# Patient Record
Sex: FEMALE | Race: WHITE | NOT HISPANIC OR LATINO
[De-identification: names, ages, dates, MRNs, and addresses within clinical notes are randomized per-mention and may not be internally consistent; named-entity substitution may affect disease eponyms.]

---

## 2018-06-15 ENCOUNTER — RECORD ABSTRACTING (OUTPATIENT)
Age: 83
End: 2018-06-15

## 2018-06-15 DIAGNOSIS — Z92.241 PERSONAL HISTORY OF SYSTEMIC STEROID THERAPY: ICD-10-CM

## 2018-06-15 DIAGNOSIS — C80.1 MALIGNANT (PRIMARY) NEOPLASM, UNSPECIFIED: ICD-10-CM

## 2018-06-15 DIAGNOSIS — Z82.49 FAMILY HISTORY OF ISCHEMIC HEART DISEASE AND OTHER DISEASES OF THE CIRCULATORY SYSTEM: ICD-10-CM

## 2018-06-15 DIAGNOSIS — Z85.9 PERSONAL HISTORY OF MALIGNANT NEOPLASM, UNSPECIFIED: ICD-10-CM

## 2018-06-15 DIAGNOSIS — Z87.19 PERSONAL HISTORY OF OTHER DISEASES OF THE DIGESTIVE SYSTEM: ICD-10-CM

## 2018-06-15 RX ORDER — TRAMADOL HYDROCHLORIDE 50 MG/1
50 TABLET, COATED ORAL
Refills: 0 | Status: ACTIVE | COMMUNITY

## 2018-06-15 RX ORDER — LIDOCAINE 50 MG/G
5 PATCH CUTANEOUS
Refills: 0 | Status: ACTIVE | COMMUNITY

## 2018-06-18 ENCOUNTER — APPOINTMENT (OUTPATIENT)
Dept: ORTHOPEDIC SURGERY | Facility: CLINIC | Age: 83
End: 2018-06-18
Payer: MEDICARE

## 2018-06-18 VITALS — HEIGHT: 61 IN | WEIGHT: 180 LBS | BODY MASS INDEX: 33.99 KG/M2

## 2018-06-18 PROCEDURE — 99213 OFFICE O/P EST LOW 20 MIN: CPT

## 2018-06-29 ENCOUNTER — APPOINTMENT (OUTPATIENT)
Dept: ORTHOPEDIC SURGERY | Facility: CLINIC | Age: 83
End: 2018-06-29
Payer: MEDICARE

## 2018-06-29 VITALS — WEIGHT: 180 LBS | BODY MASS INDEX: 33.99 KG/M2 | HEIGHT: 61 IN

## 2018-06-29 DIAGNOSIS — M19.072 PRIMARY OSTEOARTHRITIS, LEFT ANKLE AND FOOT: ICD-10-CM

## 2018-06-29 PROCEDURE — 73610 X-RAY EXAM OF ANKLE: CPT | Mod: LT

## 2018-06-29 PROCEDURE — 20610 DRAIN/INJ JOINT/BURSA W/O US: CPT | Mod: LT

## 2018-06-29 PROCEDURE — 99214 OFFICE O/P EST MOD 30 MIN: CPT | Mod: 25

## 2018-06-29 RX ORDER — FUROSEMIDE 20 MG/1
20 TABLET ORAL
Qty: 30 | Refills: 0 | Status: ACTIVE | COMMUNITY
Start: 2018-04-16

## 2018-06-29 RX ORDER — AMOXICILLIN 500 MG/1
500 CAPSULE ORAL
Qty: 21 | Refills: 0 | Status: ACTIVE | COMMUNITY
Start: 2018-04-04

## 2018-06-29 RX ORDER — FLUTICASONE PROPIONATE 50 UG/1
50 SPRAY, METERED NASAL
Qty: 16 | Refills: 0 | Status: ACTIVE | COMMUNITY
Start: 2018-02-05

## 2018-06-29 RX ORDER — ROSUVASTATIN CALCIUM 40 MG/1
40 TABLET, FILM COATED ORAL
Qty: 90 | Refills: 0 | Status: ACTIVE | COMMUNITY
Start: 2018-05-14

## 2018-06-29 RX ORDER — ROSUVASTATIN CALCIUM 20 MG/1
20 TABLET, FILM COATED ORAL
Qty: 30 | Refills: 0 | Status: ACTIVE | COMMUNITY
Start: 2017-05-15

## 2018-10-23 ENCOUNTER — APPOINTMENT (OUTPATIENT)
Dept: ORTHOPEDIC SURGERY | Facility: CLINIC | Age: 83
End: 2018-10-23
Payer: MEDICARE

## 2018-10-23 PROCEDURE — 20610 DRAIN/INJ JOINT/BURSA W/O US: CPT

## 2018-10-23 PROCEDURE — 72100 X-RAY EXAM L-S SPINE 2/3 VWS: CPT

## 2018-10-23 PROCEDURE — 73564 X-RAY EXAM KNEE 4 OR MORE: CPT | Mod: LT

## 2018-10-23 PROCEDURE — 99214 OFFICE O/P EST MOD 30 MIN: CPT | Mod: 25

## 2018-12-28 ENCOUNTER — APPOINTMENT (OUTPATIENT)
Dept: ORTHOPEDIC SURGERY | Facility: CLINIC | Age: 83
End: 2018-12-28
Payer: MEDICARE

## 2018-12-28 VITALS — BODY MASS INDEX: 33.99 KG/M2 | WEIGHT: 180 LBS | HEIGHT: 61 IN

## 2018-12-28 DIAGNOSIS — M17.10 UNILATERAL PRIMARY OSTEOARTHRITIS, UNSPECIFIED KNEE: ICD-10-CM

## 2018-12-28 PROCEDURE — 20610 DRAIN/INJ JOINT/BURSA W/O US: CPT | Mod: LT

## 2018-12-28 PROCEDURE — 99214 OFFICE O/P EST MOD 30 MIN: CPT | Mod: 25

## 2019-02-25 ENCOUNTER — RX RENEWAL (OUTPATIENT)
Age: 84
End: 2019-02-25

## 2019-04-02 ENCOUNTER — APPOINTMENT (OUTPATIENT)
Dept: ORTHOPEDIC SURGERY | Facility: CLINIC | Age: 84
End: 2019-04-02
Payer: MEDICARE

## 2019-04-02 VITALS — HEIGHT: 61 IN | WEIGHT: 180 LBS | BODY MASS INDEX: 33.99 KG/M2

## 2019-04-02 PROCEDURE — 20610 DRAIN/INJ JOINT/BURSA W/O US: CPT | Mod: LT

## 2019-04-02 PROCEDURE — 73030 X-RAY EXAM OF SHOULDER: CPT | Mod: RT

## 2019-04-02 PROCEDURE — 99214 OFFICE O/P EST MOD 30 MIN: CPT | Mod: 25

## 2019-04-02 NOTE — END OF VISIT
[FreeTextEntry3] : All medical record entries made by the [facility] were at my, Dr. Austin Luna, direction and personally dictated by me on 04/02/2019. I have reviewed the chart and agree that the record accurately reflects my personal performance of the history, physical exam, assessment and plan. I have also personally directed, reviewed, and agreed with the chart.

## 2019-04-02 NOTE — ADDENDUM
[FreeTextEntry1] : I, Nicole Worthington, acted solely as a scribe for Dr. Austin Luna on this date 04/02/2019.

## 2019-04-02 NOTE — DISCUSSION/SUMMARY
[de-identified] : The underlying pathophysiology was reviewed in great detail with the patient as well as the various treatment options, including ice, analgesics, NSAIDs, Physical therapy, steroid injections.\par  \par The patient wishes to proceed with an ASPIRATION and DUROLANE injection of the left knee. \par \par FU PRN.

## 2019-04-02 NOTE — PROCEDURE
[de-identified] : At this point I recommended an aspiration and therapeutic injection and under sterile precautions an injection of 3 cc of Durolane (Lot: [id number], Expiration: 09/30/2020), was placed into the joint of the Left knee without complication after 10 cc of clear synovial fluid was aspirated.

## 2019-04-02 NOTE — HISTORY OF PRESENT ILLNESS
[de-identified] : 83 year old female presents for an evaluation of chronic left knee pain, she has been diagnosed with osteoarthritis of her knee. At her last visit on 12/28/2019 she received a corticosteroid injection of her left knee and reports that it alleviated her symptoms which has since returned. She reports that she has been experiencing a constant aching pain located along the medial aspect of her left knee that is exacerbated with walking, bending, and climbing stairs.She has been treated with hyaluronic acid injections in the past and would like to proceed with a Monovisc injection of her left knee today. She also reports that she has begun developing pain along the anterior aspect of her right shoulder that is exacerbated with us of her right arm.

## 2019-04-02 NOTE — PHYSICAL EXAM
[Normal RLE] : Right Lower Extremity: No scars, rashes, lesions, ulcers, skin intact [Normal LLE] : Left Lower Extremity: No scars, rashes, lesions, ulcers, skin intact [Normal Touch] : sensation intact for touch [Normal] : No swelling, no edema, normal pedal pulses and normal temperature [Obese] : obese [Normal RUE] : Right Upper Extremity: No scars, rashes, lesions, ulcers, skin intact [Normal LUE] : Left Upper Extremity: No scars, rashes, lesions, ulcers, skin intact [Poor Appearance] : well-appearing [Acute Distress] : not in acute distress [de-identified] : Right Upper Extremity\par o Shoulder :\par ¦ Inspection/Palpation : no tenderness, no swelling, no deformity \par ¦ Range of Motion : ACTIVE FORWARD ELEVATION: Measured at 145 degrees, ACTIVE EXTERNAL ROTATION: Measured at 40 degrees, ACTIVE INTERNAL ROTATION: Measured at PSIS\par ¦ Strength : external rotation 5/5, internal rotation 5/5, supraspinatus 5/5 \par ¦ Stability : no joint instability on provocative testing\par o Upper Arm : no tenderness, no swelling, no deformities\par o Muscle Bulk : no atrophy \par o Sensation : sensation intact to light touch \par o Skin : no skin rash, no discoloration \par o Vascular Exam : no edema, no cyanosis, radial and ulnar pulses normal \par \par Left Lower Extremity\par o Knee :\par ¦ Inspection/Palpation :  medial tenderness, swelling with 1+ effusion, no deformity \par ¦ Range of Motion : 0 - 120 degrees\par ¦ Stability : no valgus or varus instability present on provocative testing, Lachman’s Test (-)\par ¦ Strength : flexion and extension 5/5\par o Muscle Bulk : normal muscle bulk present\par o Skin : no erythema, no ecchymosis \par o Sensation : sensation to pin intact\par o Vascular Exam : no edema, no cyanosis, dorsalis pedis artery pulse 2+, posterior tibial artery pulse 2+  [de-identified] : o  Right Shoulder : Internal/External rotation, and outlet views were obtained, there are no soft tissue abnormalities, no fractures, alignment is normal, mild glenohumeral osteoarthritis, small osteophyte in the inferior humeral head.

## 2019-06-11 ENCOUNTER — APPOINTMENT (OUTPATIENT)
Dept: ORTHOPEDIC SURGERY | Facility: CLINIC | Age: 84
End: 2019-06-11
Payer: MEDICARE

## 2019-06-11 VITALS — HEIGHT: 61 IN | BODY MASS INDEX: 33.99 KG/M2 | WEIGHT: 180 LBS

## 2019-06-11 DIAGNOSIS — S76.911A STRAIN OF UNSPECIFIED MUSCLES, FASCIA AND TENDONS AT THIGH LEVEL, RIGHT THIGH, INITIAL ENCOUNTER: ICD-10-CM

## 2019-06-11 PROCEDURE — 20610 DRAIN/INJ JOINT/BURSA W/O US: CPT | Mod: LT

## 2019-06-11 PROCEDURE — 99213 OFFICE O/P EST LOW 20 MIN: CPT | Mod: 25

## 2019-06-11 NOTE — HISTORY OF PRESENT ILLNESS
[de-identified] : 83 year old female presents for an evaluation of chronic left knee pain, she has been diagnosed with advanced medial compartment osteoarthritis of her left knee. At her last visit on 4/2/2019 she underwent and aspiration and received a Durolane injection of the left knee, this only provided her with temporary relief from her symptoms. Her pain has since returned and she has continued to experience a a moderate aching pain along the medial aspect of her left knee that is constant in nature. Her pain is exacerbated with walking, bending, weight bearing, and climbing stairs. The patient would like to proceed with a corticosteroid injection of the left knee at this time.

## 2019-06-11 NOTE — PHYSICAL EXAM
[Normal RLE] : Right Lower Extremity: No scars, rashes, lesions, ulcers, skin intact [Normal LLE] : Left Lower Extremity: No scars, rashes, lesions, ulcers, skin intact [Normal Touch] : sensation intact for touch [Normal] : No swelling, no edema, normal pedal pulses and normal temperature [Obese] : obese [Poor Appearance] : well-appearing [Acute Distress] : not in acute distress [de-identified] : Right Lower Extremity\par o Knee :\par ¦ Inspection/Palpation : tenderness to palpation over the adductor, no swelling, no deformity \par ¦ Range of Motion : 0 - 115 degrees\par ¦ Stability : no valgus or varus instability present on provocative testing, Lachman’s Test (-)\par ¦ Strength : flexion and extension 5/5\par o Muscle Bulk : normal muscle bulk present\par o Skin : no erythema, no ecchymosis \par o Sensation : sensation to pin intact\par o Vascular Exam : no edema, no cyanosis, dorsalis pedis artery pulse 2+, posterior tibial artery pulse 2+ \par \par Left Lower Extremity\par o Knee :\par ¦ Inspection/Palpation : moderate medial joint line tenderness, no swelling, no deformity \par ¦ Range of Motion : 0 - 110 degrees\par ¦ Stability : no valgus or varus instability present on provocative testing, Lachman’s Test (-)\par ¦ Strength : flexion and extension 5-/5\par o Muscle Bulk : normal muscle bulk present\par o Skin : no erythema, no ecchymosis \par o Sensation : sensation to pin intact\par o Vascular Exam : no edema, no cyanosis, dorsalis pedis artery pulse 2+, posterior tibial artery pulse 2+

## 2019-06-11 NOTE — END OF VISIT
[FreeTextEntry3] : All medical record entries made by the [facility] were at my, Dr. Austin Luna, direction and personally dictated by me on 06/11/2019. I have reviewed the chart and agree that the record accurately reflects my personal performance of the history, physical exam, assessment and plan. I have also personally directed, reviewed, and agreed with the chart.

## 2019-06-11 NOTE — DISCUSSION/SUMMARY
[de-identified] : The underlying pathophysiology was reviewed in great detail with the patient as well as the various treatment options, including ice, analgesics, NSAIDs, Physical therapy, steroid injections.\par \par The patient wishes to proceed with an INJECTION of the left knee.\par \par She is to continue with physical therapy.\par \par FU PRN.

## 2019-06-11 NOTE — PROCEDURE
[de-identified] : At this point I recommended a therapeutic injection and under sterile precautions an injection of 5 cc 1% lidocaine with 0.5 cc of Kenalog and 0.5 cc of Dexamethasone - was placed into the joint of the Left knee without complication, and after several minutes, the patient felt significant relief.

## 2019-06-11 NOTE — ADDENDUM
[FreeTextEntry1] : I, Nicole Worthington, acted solely as a scribe for Dr. Austin Luna on this date 06/11/2019.

## 2019-09-27 ENCOUNTER — APPOINTMENT (OUTPATIENT)
Dept: ORTHOPEDIC SURGERY | Facility: CLINIC | Age: 84
End: 2019-09-27
Payer: MEDICARE

## 2019-09-27 DIAGNOSIS — S86.111A STRAIN OF OTHER MUSCLE(S) AND TENDON(S) OF POSTERIOR MUSCLE GROUP AT LOWER LEG LEVEL, RIGHT LEG, INITIAL ENCOUNTER: ICD-10-CM

## 2019-09-27 DIAGNOSIS — M50.30 OTHER CERVICAL DISC DEGENERATION, UNSPECIFIED CERVICAL REGION: ICD-10-CM

## 2019-09-27 PROCEDURE — 20610 DRAIN/INJ JOINT/BURSA W/O US: CPT | Mod: LT

## 2019-09-27 PROCEDURE — 99214 OFFICE O/P EST MOD 30 MIN: CPT | Mod: 25

## 2019-09-27 NOTE — DISCUSSION/SUMMARY
[de-identified] : The underlying pathophysiology was reviewed in great detail with the patient as well as the various treatment options, including ice, analgesics, NSAIDs, Physical therapy, steroid injections.\par \par The patient wishes to proceed with an INJECTION of the left knee.\par \par A prescription for Physical Therapy was provided for the neck and lower back.\par \par FU PRN.

## 2019-09-27 NOTE — END OF VISIT
[FreeTextEntry3] : All medical record entries made by the [facility] were at my, Dr. Austin Luna, direction and personally dictated by me on 09/27/2019. I have reviewed the chart and agree that the record accurately reflects my personal performance of the history, physical exam, assessment and plan. I have also personally directed, reviewed, and agreed with the chart.

## 2019-09-27 NOTE — ADDENDUM
[FreeTextEntry1] : I, Savana Marie, acted solely as a scribe for Dr. Austin Luna on this date 09/27/2019.

## 2019-09-27 NOTE — PROCEDURE
[de-identified] : At this point I recommended a therapeutic injection and under sterile precautions an injection of 5 cc 1% lidocaine with 0.5 cc of Kenalog and 0.5 cc of Dexamethasone - was placed into the joint of the Left knee without complication, and after several minutes, the patient felt significant relief.

## 2019-09-27 NOTE — HISTORY OF PRESENT ILLNESS
[de-identified] : 84 year old female presents for an evaluation of chronic left knee pain and right lower leg pain, she has been diagnosed with advanced medial compartment osteoarthritis of her left knee.  At her last visit on 6/11/2019 she received a corticosteroid injection of the left knee which greatly alleviated her symptoms. Her pain has since return and she reports that she continues to experience an aching pain along the medial aspect of her left knee that is constant in nature. She also reports an aching pain located along the anterior aspect of her right lower leg, as well as sensations of numbness. Her symptoms are exacerbated with walking, deep bending, and with climbing stairs. The patient has no other complaints at this time.

## 2019-12-06 ENCOUNTER — APPOINTMENT (OUTPATIENT)
Dept: ORTHOPEDIC SURGERY | Facility: CLINIC | Age: 84
End: 2019-12-06
Payer: MEDICARE

## 2019-12-06 PROCEDURE — 99214 OFFICE O/P EST MOD 30 MIN: CPT | Mod: 25

## 2019-12-06 PROCEDURE — 20610 DRAIN/INJ JOINT/BURSA W/O US: CPT | Mod: LT

## 2019-12-07 NOTE — PROCEDURE
[de-identified] : At this point I recommended a therapeutic injection and under sterile precautions an injection of 3 cc of Durolane (Lot: [id number], Expiration: 06/2022), was placed into the joint of the Left knee without complication.

## 2019-12-07 NOTE — DISCUSSION/SUMMARY
[de-identified] : The underlying pathophysiology was reviewed in great detail with the patient as well as the various treatment options, including ice, analgesics, NSAIDs, Physical therapy, steroid injections.\par \par The patient wishes to proceed with a DUROLANE injection of the left knee. \par \par An MRI of the lumbar spine was ordered to rule out HNP. \par \par FU after imaging is obtained.

## 2019-12-07 NOTE — PHYSICAL EXAM
[Normal RLE] : Right Lower Extremity: No scars, rashes, lesions, ulcers, skin intact [Normal LLE] : Left Lower Extremity: No scars, rashes, lesions, ulcers, skin intact [Normal] : No swelling, no edema, normal pedal pulses and normal temperature [Normal Touch] : sensation intact for touch [Obese] : obese [Poor Appearance] : well-appearing [Acute Distress] : not in acute distress [de-identified] : Right Lower Extremity\par o Knee :\par ¦ Inspection/Palpation : no tenderness to palpation, no swelling, no deformity\par ¦ Range of Motion : 0 - 110 degrees, no crepitus\par ¦ Stability : no valgus or varus instability present on provocative testing, Lachman’s Test (-)\par ¦ Strength : flexion and extension 5/5, dorsiflexion 5/5\par o Muscle Bulk : normal muscle bulk present\par o Skin : no erythema, no ecchymosis\par o Sensation : dull sensation over the distal lateral lower leg and the lateral foot\par o Vascular Exam : no edema, no cyanosis, dorsalis pedis artery pulse 2+, posterior tibial artery pulse 2+ \par \par Left Lower Extremity\par o Knee :\par ¦ Inspection/Palpation : medial joint line tenderness, trace effusion, no deformity \par ¦ Range of Motion : 0 - 100 degrees\par ¦ Stability : no valgus or varus instability present on provocative testing, Lachman’s Test (-)\par ¦ Strength : flexion and extension 3/5\par o Muscle Bulk : normal muscle bulk present\par o Skin : no erythema, no ecchymosis \par o Sensation : sensation to pin intact\par o Vascular Exam : no edema, no cyanosis, dorsalis pedis artery pulse 2+, posterior tibial artery pulse 2+

## 2019-12-07 NOTE — HISTORY OF PRESENT ILLNESS
[de-identified] : 84 year old female presents for an evaluation of chronic left knee and right leg pain, she has been diagnosed with advanced medial compartment osteoarthritis of her left knee. At her last visit on 9/27/2019 she received a corticosteroid injection of her left knee which greatly alleviated her symptoms. Her pain has since returned and she describes an aching pain along the medial aspect of her left knee that is constant in nature. Her symptoms are exacerbated with walking, deep bending, and with climbing stairs. The patient also reports that she continues to experience pain along the anterior aspect of her right lower leg that radiates down her left right foot, as well as numbness radiating down the lower leg. The patient has no other complaints at this time.

## 2019-12-07 NOTE — ADDENDUM
[FreeTextEntry1] : I, Savana Marie, acted solely as a scribe for Dr. Austin Luna on this date 12/06/2019.

## 2019-12-10 ENCOUNTER — OTHER (OUTPATIENT)
Age: 84
End: 2019-12-10

## 2020-01-10 ENCOUNTER — APPOINTMENT (OUTPATIENT)
Dept: ORTHOPEDIC SURGERY | Facility: CLINIC | Age: 85
End: 2020-01-10
Payer: MEDICARE

## 2020-01-10 PROCEDURE — 73564 X-RAY EXAM KNEE 4 OR MORE: CPT | Mod: LT

## 2020-01-10 PROCEDURE — 20610 DRAIN/INJ JOINT/BURSA W/O US: CPT | Mod: LT

## 2020-01-10 PROCEDURE — 99213 OFFICE O/P EST LOW 20 MIN: CPT | Mod: 25

## 2020-01-10 NOTE — END OF VISIT
[FreeTextEntry3] : All medical record entries made by the [facility] were at my, Dr. Austin Luna, direction and personally dictated by me on 01/10/2020. I have reviewed the chart and agree that the record accurately reflects my personal performance of the history, physical exam, assessment and plan. I have also personally directed, reviewed, and agreed with the chart.

## 2020-01-10 NOTE — PROCEDURE
[de-identified] : At this point I recommended a therapeutic injection and under sterile precautions an injection of 5 cc 1% lidocaine with 0.5 cc of Kenalog and 0.5 cc of Dexamethasone - was placed into the joint of the Left knee without complication, and after several minutes, the patient felt significant relief.

## 2020-01-10 NOTE — PHYSICAL EXAM
[Normal RLE] : Right Lower Extremity: No scars, rashes, lesions, ulcers, skin intact [Normal LLE] : Left Lower Extremity: No scars, rashes, lesions, ulcers, skin intact [Normal Touch] : sensation intact for touch [Normal] : No swelling, no edema, normal pedal pulses and normal temperature [Obese] : obese [Poor Appearance] : well-appearing [Acute Distress] : not in acute distress [de-identified] : Left Lower Extremity\par o Knee :\par ¦ Inspection/Palpation : marked medial tenderness, trace effusion, no deformity \par ¦ Range of Motion : 0 - 100 degrees, no crepitus \par ¦ Stability : no valgus or varus instability present on provocative testing, Lachman’s Test (-)\par ¦ Strength : flexion and extension 5/5\par o Muscle Bulk : normal muscle bulk present \par o Skin : no erythema, no ecchymosis \par o Sensation : sensation to pin intact\par o Vascular Exam : no edema, no cyanosis, dorsalis pedis artery pulse 2+, posterior tibial artery pulse 2+  [de-identified] : o An MRI of the lumbar spine was obtained at [facility] on 12/9/2019, revealed:\par 1. There are multilevel degenerative changes involving the lumbar spine. \par 2. There is mild to moderate spinal stenosis at the L2-3, L3-4, and L4-5 levels which has mildly worsened compared to prior MRI from March 2011. \par 3. There is a small central and right parasagittal disc herniation at the L5-S1 level which is similar to prior MRI. \par 4. There is small central disc herniation at the L4-5 level which is slightly more prominent that on the prior study. \par 5. There is small central disc herniation at the L3-4 level which is more prominent than seen on prior. \par 6. There is a very small right parasagittal disc herniation at the L2-3 level which is more prominent than on prior. \par \par XRays obtained in the office today, 1/10/2020:\par o Left Knee : AP, lateral, sunrise, and Lam views of the knee were obtained, there are no soft tissue abnormalities, no fractures, alignment is normal, moderate to advanced medial compartment osteoarthritis, normal bone density, no bony lesions.

## 2020-01-10 NOTE — ADDENDUM
[FreeTextEntry1] : I, Savana Marie, acted solely as a scribe for Dr. Austin Luna on this date 01/10/2020.

## 2020-01-10 NOTE — HISTORY OF PRESENT ILLNESS
[de-identified] : 84 year old female presents for an evaluation of chronic left knee pain, she has been diagnosed with advanced medial compartment osteoarthritis of her left knee. At her last visit on 12/6/2019 she received a Durolane injection of her left knee which caused exacerbation of her symptoms. The patient reports that she has been experiencing an aching pain along the medial aspect of her left knee that is constant in nature. Her symptoms are exacerbated with walking, deep bending, and with climbing stairs. She would like to proceed with a corticosteroid injection of her left knee at this time.

## 2020-01-10 NOTE — DISCUSSION/SUMMARY
[de-identified] : The underlying pathophysiology was reviewed in great detail with the patient as well as the various treatment options, including ice, analgesics, NSAIDs, Physical therapy, steroid injections.\par \par The patient wishes to proceed with an INJECTION of her left knee. \par \par FU PRN.

## 2020-03-31 ENCOUNTER — APPOINTMENT (OUTPATIENT)
Dept: ORTHOPEDIC SURGERY | Facility: CLINIC | Age: 85
End: 2020-03-31
Payer: MEDICARE

## 2020-03-31 PROCEDURE — 99213 OFFICE O/P EST LOW 20 MIN: CPT | Mod: 25

## 2020-03-31 PROCEDURE — 20610 DRAIN/INJ JOINT/BURSA W/O US: CPT | Mod: LT

## 2020-03-31 NOTE — HISTORY OF PRESENT ILLNESS
[de-identified] : 84 year old female presents for an evaluation of chronic left knee pain, she has been diagnosed with advanced medial compartment osteoarthritis of her left knee. At her last visit on 01/10/2020 she received a cortisone injection which provided good relief in symptoms. On 12/6/2019 she received a Durolane injection of her left knee which caused exacerbation of her symptoms. The patient reports that she has been experiencing an aching pain along the medial aspect of her left knee that is constant in nature. She reports a new location of pain and swelling located around the pes anserine bursa.  Her symptoms are exacerbated with walking, deep bending, and with climbing stairs.

## 2020-03-31 NOTE — DISCUSSION/SUMMARY
[de-identified] : The underlying pathophysiology was reviewed in great detail with the patient as well as the various treatment options, including ice, analgesics, NSAIDs, Physical therapy, steroid injections.\par \par The patient wishes to proceed with an INJECTION of the pes anserine of the left knee. \par \par FU PRN.

## 2020-03-31 NOTE — PHYSICAL EXAM
[Normal RLE] : Right Lower Extremity: No scars, rashes, lesions, ulcers, skin intact [Normal LLE] : Left Lower Extremity: No scars, rashes, lesions, ulcers, skin intact [Normal Touch] : sensation intact for touch [Normal] : No swelling, no edema, normal pedal pulses and normal temperature [Obese] : obese [Poor Appearance] : well-appearing [Acute Distress] : not in acute distress [de-identified] : Left Lower Extremity\par o Knee :\par ¦ Inspection/Palpation : marked medial tenderness and pes anserine bursa, no effusion, mild swelling localized to pes anserine bursa, no deformity \par ¦ Range of Motion : 0 - 100 degrees, no crepitus \par ¦ Stability : no valgus or varus instability present on provocative testing, Lachman’s Test (-)\par ¦ Strength : flexion and extension 5/5\par o Muscle Bulk : normal muscle bulk present \par o Skin : no erythema, no ecchymosis \par o Sensation : sensation to pin intact\par o Vascular Exam : no edema, no cyanosis, dorsalis pedis artery pulse 2+, posterior tibial artery pulse 2+

## 2020-03-31 NOTE — PROCEDURE
[de-identified] : At this point I recommended a therapeutic injection and under sterile precautions an injection of 2 cc 1% lidocaine with 0.5 cc of Kenalog and 0.5 cc of Dexamethasone - was placed into the pes anserine bursa of the left knee  without complication, and after several minutes, the patient felt significant relief.\par \par \par

## 2020-05-12 ENCOUNTER — APPOINTMENT (OUTPATIENT)
Dept: ORTHOPEDIC SURGERY | Facility: CLINIC | Age: 85
End: 2020-05-12
Payer: MEDICARE

## 2020-05-12 VITALS — TEMPERATURE: 96.8 F

## 2020-05-12 PROCEDURE — 20610 DRAIN/INJ JOINT/BURSA W/O US: CPT | Mod: LT

## 2020-05-12 PROCEDURE — 99213 OFFICE O/P EST LOW 20 MIN: CPT | Mod: 25

## 2020-05-12 NOTE — DISCUSSION/SUMMARY
[de-identified] : The underlying pathophysiology was reviewed in great detail with the patient as well as the various treatment options, including ice, analgesics, NSAIDs, Physical therapy, steroid injections.\par \par The patient wishes to proceed with a ZILRETTA  INJECTION of the left knee today. \par \par FU PRN.

## 2020-05-12 NOTE — PHYSICAL EXAM
[Normal RLE] : Right Lower Extremity: No scars, rashes, lesions, ulcers, skin intact [Normal LLE] : Left Lower Extremity: No scars, rashes, lesions, ulcers, skin intact [Normal Touch] : sensation intact for touch [Normal] : No swelling, no edema, normal pedal pulses and normal temperature [Obese] : obese [Poor Appearance] : well-appearing [Acute Distress] : not in acute distress [de-identified] : Left Lower Extremity\par o Knee :\par ¦ Inspection/Palpation : marked medial tenderness and pes anserine bursa, no effusion, mild swelling localized to pes anserine bursa, no deformity \par ¦ Range of Motion : 0 - 100 degrees, no crepitus \par ¦ Stability : no valgus or varus instability present on provocative testing, Lachman’s Test (-)\par ¦ Strength : flexion and extension 5/5\par o Muscle Bulk : normal muscle bulk present \par o Skin : no erythema, no ecchymosis \par o Sensation : sensation to pin intact\par o Vascular Exam : no edema, no cyanosis, dorsalis pedis artery pulse 2+, posterior tibial artery pulse 2+

## 2020-05-12 NOTE — HISTORY OF PRESENT ILLNESS
[de-identified] : 84 year old female presents for an evaluation of chronic left knee pain, she has been diagnosed with advanced medial compartment osteoarthritis of her left knee. At her last visit on 01/10/2020 she received a cortisone injection which provided good relief in symptoms. On 03/31/2020 she received a corticosteroid injection that moderately relieved her symptoms temporarily. The patient reports that she has been experiencing an aching pain along the medial aspect of her left knee that is constant in nature. She reports a new location of pain and swelling located around the pes anserine bursa.  Her symptoms are exacerbated with walking, deep bending, and with climbing stairs. Of note, patient reports falling off a step stool and landing on her back last week. She is reporting right side rib pain since the fall. She is here today for a Zilretta injection of the left knee.

## 2020-05-12 NOTE — PROCEDURE
[de-identified] : At this point I recommended a therapeutic injection and under sterile precautions an injection of 32 cc of Zilretta (Lot: [id number] , Expiration: 02/2021 ), was placed into the joint of the Left knee without complication.\par \par \par

## 2020-07-07 ENCOUNTER — APPOINTMENT (OUTPATIENT)
Dept: ORTHOPEDIC SURGERY | Facility: CLINIC | Age: 85
End: 2020-07-07
Payer: MEDICARE

## 2020-07-07 PROCEDURE — 20610 DRAIN/INJ JOINT/BURSA W/O US: CPT | Mod: LT

## 2020-07-07 PROCEDURE — 99213 OFFICE O/P EST LOW 20 MIN: CPT | Mod: 25

## 2020-07-07 NOTE — DISCUSSION/SUMMARY
[de-identified] : The underlying pathophysiology was reviewed in great detail with the patient as well as the various treatment options, including ice, analgesics, NSAIDs, Physical therapy, steroid injections, bracing ,compression sleeves/stockings, TKR. \par \par The patient wishes to proceed with an INJECTION of the pes anserine of the left knee. \par \par Activity modifications and restrictions were discussed. I advised avoiding deep bending, squatting and high intensity activity. I discussed the importance of weight loss to alleviate her knee pain\par \par FU PRN.

## 2020-07-07 NOTE — PROCEDURE
[de-identified] : At this point I recommended a therapeutic injection and under sterile precautions an injection of 2 cc 1% lidocaine with 0.5 cc of Kenalog and 0.5 cc of Dexamethasone - was placed into the pes anserine bursa of the left knee without complication, and after several minutes, the patient felt significant relief.

## 2020-07-07 NOTE — HISTORY OF PRESENT ILLNESS
[de-identified] : 84 year old female presents for an evaluation of chronic left knee pain, she has been diagnosed with advanced medial compartment osteoarthritis of her left knee. At her last visit on 01/10/2020 she received a cortisone injection which provided good relief in symptoms. She last received a Durolane injection of the left knee on 12/06/2019. On 03/31/2020 she received a corticosteroid injection pes anserine bursa that moderately relieved her symptoms temporarily.  She received a  Zilretta injection of the left knee on 05/12/2020. The injection provided her with moderate relief of symptoms for about one month. Her pain has since returned and is very painful today. The patient reports that she has been experiencing an aching pain along the medial aspect of her left knee that is constant in nature. She reports a new location of pain and swelling located around the pes anserine bursa.  Her symptoms are exacerbated with walking, deep bending, and with climbing stairs.  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

## 2020-07-07 NOTE — PHYSICAL EXAM
[Normal RLE] : Right Lower Extremity: No scars, rashes, lesions, ulcers, skin intact [Normal LLE] : Left Lower Extremity: No scars, rashes, lesions, ulcers, skin intact [Normal Touch] : sensation intact for touch [Normal] : No swelling, no edema, normal pedal pulses and normal temperature [Obese] : obese [Poor Appearance] : well-appearing [Acute Distress] : not in acute distress [de-identified] : Left Lower Extremity\par o Knee :\par ¦ Inspection/Palpation : marked medial tenderness and pes anserine bursa, no effusion, mild swelling localized to pes anserine bursa, no deformity \par ¦ Range of Motion : 0 - 100 degrees, no crepitus \par ¦ Stability : no valgus or varus instability present on provocative testing, Lachman’s Test (-)\par ¦ Strength : flexion and extension 5/5\par o Muscle Bulk : normal muscle bulk present \par o Skin : no erythema, no ecchymosis \par o Sensation : sensation to pin intact\par o Vascular Exam : no edema, no cyanosis, dorsalis pedis artery pulse 2+, posterior tibial artery pulse 2+

## 2020-09-01 ENCOUNTER — APPOINTMENT (OUTPATIENT)
Dept: ORTHOPEDIC SURGERY | Facility: CLINIC | Age: 85
End: 2020-09-01
Payer: MEDICARE

## 2020-09-04 ENCOUNTER — APPOINTMENT (OUTPATIENT)
Dept: ORTHOPEDIC SURGERY | Facility: CLINIC | Age: 85
End: 2020-09-04
Payer: MEDICARE

## 2020-09-04 DIAGNOSIS — M79.662 PAIN IN LEFT LOWER LEG: ICD-10-CM

## 2020-09-04 DIAGNOSIS — M70.52 OTHER BURSITIS OF KNEE, LEFT KNEE: ICD-10-CM

## 2020-09-04 PROCEDURE — 20610 DRAIN/INJ JOINT/BURSA W/O US: CPT | Mod: LT

## 2020-09-04 PROCEDURE — 99213 OFFICE O/P EST LOW 20 MIN: CPT | Mod: 25

## 2020-09-04 RX ORDER — KETOCONAZOLE 20 MG/G
2 CREAM TOPICAL
Qty: 30 | Refills: 0 | Status: ACTIVE | COMMUNITY
Start: 2020-06-08

## 2020-09-04 RX ORDER — MECLIZINE HYDROCHLORIDE 12.5 MG/1
12.5 TABLET ORAL
Qty: 40 | Refills: 0 | Status: ACTIVE | COMMUNITY
Start: 2020-08-27

## 2020-09-04 RX ORDER — EZETIMIBE 10 MG/1
10 TABLET ORAL
Qty: 90 | Refills: 0 | Status: ACTIVE | COMMUNITY
Start: 2020-07-10

## 2020-09-04 RX ORDER — CEPHALEXIN 250 MG/1
250 CAPSULE ORAL
Qty: 20 | Refills: 0 | Status: ACTIVE | COMMUNITY
Start: 2020-07-07

## 2020-09-04 RX ORDER — CHLORHEXIDINE GLUCONATE, 0.12% ORAL RINSE 1.2 MG/ML
0.12 SOLUTION DENTAL
Qty: 473 | Refills: 0 | Status: ACTIVE | COMMUNITY
Start: 2020-08-05

## 2020-09-04 NOTE — PHYSICAL EXAM
[Poor Appearance] : well-appearing [Acute Distress] : not in acute distress [de-identified] : Left Lower Extremity\par o Knee :\par ¦ Inspection/Palpation : marked medial tenderness and gastrocnemius tenderness to palpation, no effusion, moderate swelling no deformity \par ¦ Range of Motion : 0 - 100 degrees, no crepitus \par ¦ Stability : no valgus or varus instability present on provocative testing, Lachman’s Test (-)\par ¦ Strength : flexion and extension 5/5\par o Muscle Bulk : normal muscle bulk present \par o Skin : no erythema, no ecchymosis \par o Sensation : sensation to pin intact\par o Vascular Exam : 1+ edema bilaterally, no cyanosis, dorsalis pedis artery pulse 2+, posterior tibial artery pulse 2+

## 2020-09-04 NOTE — PROCEDURE
[de-identified] : At this point I recommended a therapeutic injection and under sterile precautions an injection of 3 cc of Durolane (Lot: [id number] , Expiration: 02/2023 ), was placed into the joint of the Left knee without complication.\par \par \par

## 2020-09-04 NOTE — HISTORY OF PRESENT ILLNESS
[de-identified] : 85 year old female presents for an evaluation of chronic left knee pain, she has been diagnosed with advanced medial compartment osteoarthritis of her left knee. At her last visit on 01/10/2020 she received a cortisone injection which provided good relief in symptoms. She last received a Durolane injection of the left knee on 12/06/2019. On 03/31/2020 she received a corticosteroid injection pes anserine bursa that moderately relieved her symptoms temporarily.  She received a  Zilretta injection of the left knee on 05/12/2020. The injection provided her with moderate relief of symptoms for about one month. On 07/07/2020 patient received a corticosteroid of the pes anserine bursa that provided her with good relief in symptoms for approximately 6 weeks. Her pain has since returned and is very painful today. The patient reports that she has been experiencing an aching pain along the medial aspect of her left knee that is constant in nature. She also reports new onset left lower leg pain and swelling. Her symptoms are exacerbated with walking, deep bending, and with climbing stairs.

## 2020-09-04 NOTE — DISCUSSION/SUMMARY
[de-identified] : The underlying pathophysiology was reviewed in great detail with the patient as well as the various treatment options, including ice, analgesics, NSAIDs, Physical therapy, steroid injections, bracing ,compression sleeves/stockings, TKR. \par \par A prescription was provided for a Duplex US of the left lower extremity to rule out DVT.  \par \par The patient wishes to proceed with a Durolane INJECTION of the left knee today.\par \par Activity modifications and restrictions were discussed. I advised avoiding deep bending, squatting and high intensity activity. I discussed the importance of weight loss to alleviate her knee pain\par \par FU once duplex US results are obtained.\par \par She may return in 6 months for another series of gel injections as per insurance guidelines. A cortisone injection may be administered during the interim period if she cannot tolerate such a wait.

## 2020-11-09 ENCOUNTER — APPOINTMENT (OUTPATIENT)
Dept: ORTHOPEDIC SURGERY | Facility: CLINIC | Age: 85
End: 2020-11-09
Payer: MEDICARE

## 2020-11-10 ENCOUNTER — APPOINTMENT (OUTPATIENT)
Dept: ORTHOPEDIC SURGERY | Facility: CLINIC | Age: 85
End: 2020-11-10
Payer: MEDICARE

## 2020-11-10 VITALS — WEIGHT: 180 LBS | BODY MASS INDEX: 33.99 KG/M2 | HEIGHT: 61 IN

## 2020-11-10 DIAGNOSIS — M76.71 PERONEAL TENDINITIS, RIGHT LEG: ICD-10-CM

## 2020-11-10 PROCEDURE — 20610 DRAIN/INJ JOINT/BURSA W/O US: CPT | Mod: LT

## 2020-11-10 PROCEDURE — 99214 OFFICE O/P EST MOD 30 MIN: CPT | Mod: 25

## 2020-11-10 PROCEDURE — 73610 X-RAY EXAM OF ANKLE: CPT | Mod: RT

## 2020-11-10 NOTE — PROCEDURE
[de-identified] : At this point I recommended a therapeutic injection and under sterile precautions an injection of 4 cc of Zilretta (Lot: [id number] , Expiration: 02/2021), was placed into the joint of the Left  knee without complication.\par

## 2020-11-10 NOTE — PHYSICAL EXAM
[Poor Appearance] : well-appearing [Acute Distress] : not in acute distress [de-identified] : Left Lower Extremity\par o Knee :\par ¦ Inspection/Palpation : marked medial tenderness to palpation, no effusion, moderate swelling no deformity \par ¦ Range of Motion : 0 - 100 degrees, no crepitus \par ¦ Stability : no valgus or varus instability present on provocative testing, Lachman’s Test (-)\par ¦ Strength : flexion and extension 5/5\par o Muscle Bulk : normal muscle bulk present \par o Skin : no erythema, no ecchymosis \par o Sensation : sensation to pin intact\par o Vascular Exam : 1+ edema bilaterally, no cyanosis, dorsalis pedis artery pulse 2+, posterior tibial artery pulse 2+ \par \par Right Lower Extremity\par o Ankle :\par ¦ Inspection/Palpation : tender to palpation along the course of the peroneal tendons, moderate diffuse swelling, no deformities\par ¦ Range of Motion : arc of motion mildly limited and painful in all planes\par ¦ Stability : no joint instability on provocative testing\par ¦ Strength : all muscles 5/5 with pain. 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

## 2020-11-10 NOTE — HISTORY OF PRESENT ILLNESS
[de-identified] : 85 year old female presents for an evaluation of chronic left knee pain, she has been diagnosed with advanced medial compartment osteoarthritis of her left knee. At her last visit on 09/04/2020 patient received a Durolane injection of the left knee noting good relief in symptoms. On 01/10/2020 she received a cortisone injection which provided good relief in symptoms. She last received a Durolane injection of the left knee on 12/06/2019. On 03/31/2020 she received a corticosteroid injection pes anserine bursa that moderately relieved her symptoms temporarily.  She received a  Zilretta injection of the left knee on 05/12/2020. The injection provided her with moderate relief of symptoms for about one month. On 07/07/2020 patient received a corticosteroid of the pes anserine bursa that provided her with good relief in symptoms for approximately 6 weeks. The patient reports that she has been experiencing an aching pain along the medial aspect of her left knee that is constant in nature. Her symptoms are exacerbated with walking, deep bending, and with climbing stairs. \par She presents today with new onset right ankle pain. Patient reports pain began the first week of November.  Denies injury or trauma to the area.  The patient describes the pain as a dull aching, and occasionally sharp pain localized to lateral aspect of the ankle that is intermittent in nature.  Symptoms are exacerbated with weightbearing.  Pain is alleviated with rest.  Patient denies instability. She reports weakness of the ankle. She states she has been walking with a limp due to the pain. Patient denies any other complaints at this time.

## 2020-11-10 NOTE — DISCUSSION/SUMMARY
[de-identified] : The underlying pathophysiology was reviewed in great detail with the patient as well as the various treatment options, including ice, analgesics, NSAIDs, Physical therapy, steroid injections, bracing ,compression sleeves/stockings, TKR. \par \par \par \par \par Activity modifications and restrictions were discussed. I advised avoiding deep bending, squatting and high intensity activity. I discussed the importance of weight loss to alleviate her knee pain\par \par FU PRN\par

## 2021-02-05 ENCOUNTER — APPOINTMENT (OUTPATIENT)
Dept: ORTHOPEDIC SURGERY | Facility: CLINIC | Age: 86
End: 2021-02-05
Payer: MEDICARE

## 2021-02-05 PROCEDURE — 99213 OFFICE O/P EST LOW 20 MIN: CPT | Mod: 25

## 2021-02-05 PROCEDURE — 20610 DRAIN/INJ JOINT/BURSA W/O US: CPT | Mod: LT

## 2021-02-05 NOTE — DISCUSSION/SUMMARY
[de-identified] : The underlying pathophysiology was reviewed in great detail with the patient as well as the various treatment options, including ice, analgesics, NSAIDs, Physical therapy, steroid injections, bracing ,compression sleeves/stockings, TKR. \par \par Patient received a corticosteroid injection of the left knee today.\par \par A prescription for Physical Therapy was provided.\par \par Continue home exercise program. \par \par Activity modifications and restrictions were discussed. I advised avoiding deep bending, squatting and high intensity activity. I discussed the importance of weight loss to alleviate her knee pain\par \par FU in May 11th 2021 for Zilretta injection \par \par \par All questions were answered, all alternatives discussed and the patient is in complete agreement with that plan. Follow-up appointment as instructed. Any issues and the patient will call or come in sooner.

## 2021-02-05 NOTE — PHYSICAL EXAM
[Normal RLE] : Right Lower Extremity: No scars, rashes, lesions, ulcers, skin intact [Normal LLE] : Left Lower Extremity: No scars, rashes, lesions, ulcers, skin intact [Normal Touch] : sensation intact for touch [Normal] : No swelling, no edema, normal pedal pulses and normal temperature [Obese] : obese [Poor Appearance] : well-appearing [Acute Distress] : not in acute distress [de-identified] : Left Lower Extremity\par o Knee :\par ¦ Inspection/Palpation : marked medial tenderness to palpation, no effusion, moderate swelling no deformity \par ¦ Range of Motion : 0 - 100 degrees, no crepitus \par ¦ Stability : no valgus or varus instability present on provocative testing, Lachman’s Test (-)\par ¦ Strength : flexion and extension 5/5\par o Muscle Bulk : normal muscle bulk present \par o Skin : no erythema, no ecchymosis \par o Sensation : sensation to pin intact\par o Vascular Exam : 1+ edema bilaterally, no cyanosis, dorsalis pedis artery pulse 2+, posterior tibial artery pulse 2+ \par

## 2021-02-05 NOTE — HISTORY OF PRESENT ILLNESS
[de-identified] : 85 year old female presents for an evaluation of chronic left knee pain, she has been diagnosed with advanced medial compartment osteoarthritis of her left knee. At her last visit on 09/04/2020 patient received a Durolane injection of the left knee noting good relief in symptoms. On 01/10/2020 she received a cortisone injection which provided good relief in symptoms. She last received a Durolane injection of the left knee on 12/06/2019. On 03/31/2020 she received a corticosteroid injection pes anserine bursa that moderately relieved her symptoms temporarily.  She received a  Zilretta injection of the left knee on 05/12/2020. The injection provided her with moderate relief of symptoms for about one month. On 07/07/2020 patient received a corticosteroid of the pes anserine bursa that provided her with good relief in symptoms for approximately 6 weeks. The patient reports that she has been experiencing an aching pain along the medial aspect of her left knee that is constant in nature. Her symptoms are exacerbated with walking, deep bending, and with climbing stairs. At her last visit on 11/10/20 patient received a Zilretta injection noting great relief in symptoms. Patients pain has since returned and is severe in nature.

## 2021-02-05 NOTE — PROCEDURE
[de-identified] : At this point I recommended a therapeutic injection and under sterile precautions an injection of 5 cc 1% lidocaine with 0.5 cc of Kenalog and 0.5 cc of Dexamethasone - was placed into the joint of the Left knee without complication, and after several minutes, the patient felt significant relief.\par \par

## 2021-05-18 ENCOUNTER — APPOINTMENT (OUTPATIENT)
Dept: ORTHOPEDIC SURGERY | Facility: CLINIC | Age: 86
End: 2021-05-18
Payer: MEDICARE

## 2021-05-18 DIAGNOSIS — M16.12 UNILATERAL PRIMARY OSTEOARTHRITIS, LEFT HIP: ICD-10-CM

## 2021-05-18 PROCEDURE — 99214 OFFICE O/P EST MOD 30 MIN: CPT | Mod: 25

## 2021-05-18 PROCEDURE — 73502 X-RAY EXAM HIP UNI 2-3 VIEWS: CPT | Mod: LT

## 2021-05-18 PROCEDURE — 76882 US LMTD JT/FCL EVL NVASC XTR: CPT | Mod: 59,LT

## 2021-05-18 PROCEDURE — 20610 DRAIN/INJ JOINT/BURSA W/O US: CPT | Mod: LT

## 2021-05-18 NOTE — PHYSICAL EXAM
[Normal RLE] : Right Lower Extremity: No scars, rashes, lesions, ulcers, skin intact [Normal LLE] : Left Lower Extremity: No scars, rashes, lesions, ulcers, skin intact [Normal Touch] : sensation intact for touch [Normal] : No swelling, no edema, normal pedal pulses and normal temperature [Obese] : obese [Poor Appearance] : well-appearing [Acute Distress] : not in acute distress [de-identified] : Left Lower Extremity\par o Hip :\par ¦ Inspection/Palpation : greater trochanter and diffuse IT band tenderness, no swelling, no deformity\par ¦ Range of Motion : full and painless in all planes, no crepitus\par ¦ Stability : joint stability intact\par ¦ Strength : hip flexion 5/5\par ¦ Tests and Signs : all tests for stability normal\par o Muscle Tone : tone normal\par o Muscle Bulk : normal muscle bulk present\par o Skin : no erythema, no ecchymosis\par o Sensation : sensation to light touch intact\par o Vascular Exam : no edema, no cyanosis, dorsalis pedis artery pulse 2+, posterior tibial artery pulse 2+\par \par o Knee :\par ¦ Inspection/Palpation : marked medial tenderness to palpation, no effusion, moderate anterior medial proximal tibia soft tissue swelling without fluctuance,  no deformity \par ¦ Range of Motion : 0 - 100 degrees, no crepitus \par ¦ Stability : no valgus or varus instability present on provocative testing, Lachman’s Test (-)\par ¦ Strength : flexion and extension 5/5\par o Muscle Bulk : normal muscle bulk present \par o Skin : no erythema, no ecchymosis \par o Sensation : sensation to pin intact\par o Vascular Exam : 1+ edema bilaterally, no cyanosis, dorsalis pedis artery pulse 2+, posterior tibial artery pulse 2+ \par  [de-identified] : o Left Hip and pelvis : AP and lateral views were obtained, there are no soft tissue abnormalities, no fractures, alignment is normal, normal bone density, mild degenerative changes of the left hip,  no bony lesions.\par \par A partial diagnostic ultrasound of the anterior medial proximal tibia was performed which revealed soft tissue swelling with no fluid collection. \par

## 2021-05-18 NOTE — PROCEDURE
[de-identified] : At this point I recommended a therapeutic injection and under sterile precautions an injection of 4 cc of Zilretta (Lot:[id number] , Expiration: 11/2021), was placed into the joint of the left knee without complication.

## 2021-05-18 NOTE — HISTORY OF PRESENT ILLNESS
[de-identified] : 85 year old female presents for an evaluation of chronic left knee pain, she has been diagnosed with advanced medial compartment osteoarthritis of her left knee. At her last visit on 02/05/2021 patient received a corticosteroid injection of her left knee noting good relief in symptoms for 2 months. on 09/04/2020 patient received a Durolane injection of the left knee noting good relief in symptoms. On 01/10/2020 she received a cortisone injection which provided good relief in symptoms. She last received a Durolane injection of the left knee on 12/06/2019. On 03/31/2020 she received a corticosteroid injection pes anserine bursa that moderately relieved her symptoms temporarily.  She received a Zilretta injection of the left knee on 05/12/2020 and on 11/10/2 noting great relief in symptoms.0. The injection provided her with moderate relief of symptoms for about one month. The patient reports that she has been experiencing an aching pain along the medial aspect of her left knee that is constant in nature. Her symptoms are exacerbated with walking, deep bending, and with climbing stairs. Patients pain has since returned and is severe in nature.  She also notes left worsening left hip pain since last visit. Patient denies injury or trauma to the area. She noted most pain is lateral though she does feel pain in the groin at times. Denies any other complaints at this time.

## 2021-05-18 NOTE — DISCUSSION/SUMMARY
[de-identified] : The underlying pathophysiology was reviewed in great detail with the patient as well as the various treatment options, including ice, analgesics, NSAIDs, Physical therapy, steroid injections, bracing ,compression sleeves/stockings, TKR. \par \par Patient received a Zilretta injection of the left knee today.\par \par A partial diagnostic ultrasound of the anterior medial proximal tibia was performed \par \par Continue home exercise program. \par \par Activity modifications and restrictions were discussed. I advised avoiding deep bending, squatting and high intensity activity. I discussed the importance of weight loss to alleviate her knee pain\par \par She may return in 6 months for another Zilretta injection as per insurance guidelines. A cortisone injection may be administered during the interim period if she cannot tolerate such a wait. \par \par All questions were answered, all alternatives discussed and the patient is in complete agreement with that plan. Follow-up appointment as instructed. Any issues and the patient will call or come in sooner.

## 2021-07-20 ENCOUNTER — APPOINTMENT (OUTPATIENT)
Dept: ORTHOPEDIC SURGERY | Facility: CLINIC | Age: 86
End: 2021-07-20
Payer: MEDICARE

## 2021-07-20 DIAGNOSIS — M54.16 RADICULOPATHY, LUMBAR REGION: ICD-10-CM

## 2021-07-20 DIAGNOSIS — M51.26 OTHER INTERVERTEBRAL DISC DISPLACEMENT, LUMBAR REGION: ICD-10-CM

## 2021-07-20 PROCEDURE — 20610 DRAIN/INJ JOINT/BURSA W/O US: CPT | Mod: LT

## 2021-07-20 PROCEDURE — 72100 X-RAY EXAM L-S SPINE 2/3 VWS: CPT

## 2021-07-20 PROCEDURE — 99214 OFFICE O/P EST MOD 30 MIN: CPT | Mod: 25

## 2021-07-20 NOTE — HISTORY OF PRESENT ILLNESS
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 At her last visit  on 05/18/2021 patient received a Zilretta injection of the left knee noting good relief in symptoms temporarily. She notes her pain has returned at this time. Prior to this, on 02/05/2021 patient received a corticosteroid injection of her left knee noting good relief in symptoms for 2 months. On 09/04/2020 patient received a Durolane injection of the left knee noting good relief in symptoms. On 03/31/2020 she received a corticosteroid injection pes anserine bursa that moderately relieved her symptoms temporarily. The injection provided her with moderate relief of symptoms for about one month. The patient reports that she has been experiencing an aching pain along the medial aspect of her left knee that is constant in nature. Her symptoms are exacerbated with walking, deep bending, and with climbing stairs. Patients pain has since returned and is severe in nature.  She is also complaining of low back pain. Patient reports pain intermittently for years that has been worsening overtime. Denies injury or trauma to the area.  The patient describes the pain as a dull aching, and occasionally sharp pain localized to the left  lumbar spine that is intermittent in nature.  Her symptoms are exacerbated with walking, standing.   Pain is alleviated with rest. Patient denies radicular symptoms.  Patient denies new weakness, numbness or paresthesia.  Patient denies bowel/bladder dysfunction, fevers, chills, weight loss, night pain, or night\par Patient denies any other complaints at this time. Denies any other complaints at this time.

## 2021-07-20 NOTE — PHYSICAL EXAM
[Normal RLE] : Right Lower Extremity: No scars, rashes, lesions, ulcers, skin intact [Normal LLE] : Left Lower Extremity: No scars, rashes, lesions, ulcers, skin intact [Normal Touch] : sensation intact for touch [Normal] : No swelling, no edema, normal pedal pulses and normal temperature [Obese] : obese [Poor Appearance] : well-appearing [Acute Distress] : not in acute distress [de-identified] : Lumbosacral Spine:\par Musculoskeletal Examination\par ¦ Inspection :  no deformities, multiple small skin lesions, located midline lumbar spine that are mildly erythematous macules and papules with scab formation. \par ¦ Palpation : L > R paraspinal musculature tenderness to palpation. \par ¦ Stability : no subluxations present\par ¦ Range of Motion : restricted and painful arc of motion in all planes\par ¦ Muscle Strength : paraspinal muscle strength and tone within normal limits\par ¦ Lower Extremity Muscle Strength : hip flexion 5/5, knee flexion 5/5, ankle dorsiflexion 5/5, plantar flexion 5/5, EHL 5/5\par ¦ Muscle Tone : paraspinal muscle strength and tone within normal limits\par ¦ Tests/Signs : Straight Leg Raise Test (-) bilaterally. Patellar and Achilles Reflexes (2+) bilaterally.\par \par Left Lower Extremity\par o Hip :\par ¦ Inspection/Palpation : greater trochanter and diffuse IT band tenderness, no swelling, no deformity\par ¦ Range of Motion : full and painless in all planes, no crepitus\par ¦ Stability : joint stability intact\par ¦ Strength : hip flexion 5/5\par ¦ Tests and Signs : all tests for stability normal\par o Muscle Tone : tone normal\par o Muscle Bulk : normal muscle bulk present\par o Skin : no erythema, no ecchymosis\par o Sensation : sensation to light touch intact\par o Vascular Exam : no edema, no cyanosis, dorsalis pedis artery pulse 2+, posterior tibial artery pulse 2+\par \par o Knee :\par ¦ Inspection/Palpation : marked medial tenderness to palpation, no effusion, moderate anterior medial proximal tibia soft tissue swelling without fluctuance,  no deformity \par ¦ Range of Motion : 0 - 100 degrees, no crepitus \par ¦ Stability : no valgus or varus instability present on provocative testing, Lachman’s Test (-)\par ¦ Strength : flexion and extension 5/5\par o Muscle Bulk : normal muscle bulk present \par o Skin : no erythema, no ecchymosis \par o Sensation : sensation to pin intact\par o Vascular Exam : 1+ edema bilaterally, no cyanosis, dorsalis pedis artery pulse 2+, posterior tibial artery pulse 2+ \par  [de-identified] : o Lumbosacral Spine : AP and lateral views were obtained, there are no soft tissue abnormalities, no fractures, alignment is normal, moderate diffuse degenerative changes, severe degenerative changes on L3/L4, 26.5 degrees lateral curvature measured from T12-L4.  normal bone density, no bony lesions.\par \par

## 2021-07-20 NOTE — PROCEDURE
[de-identified] : At this point I recommended a therapeutic injection and under sterile precautions an injection of 3 cc of Durolane (Lot: [id number], Expiration: 11/2023 ), was placed into the joint of the LEFT knee without complication.\par \par

## 2021-07-20 NOTE — DISCUSSION/SUMMARY
[de-identified] : The underlying pathophysiology was reviewed in great detail with the patient as well as the various treatment options, including ice, analgesics, NSAIDs, Physical therapy, steroid injections, bracing ,compression sleeves/stockings, TKR. \par \par The patient elected to receive a Durolane injection into her left knee today and tolerated it well. I instructed the patient on ROM exercises, and told them to take it easy. The use of ice and rest was reviewed with the patient. The patient may resume activities in a couple of days. I reminded the patient that it takes 4 to 6 weeks after the Durolane injection to feel symptom relief \par \par Continue home exercise program. \par \par A prescription for Physical Therapy was provided.\par \par Activity modifications and restrictions were discussed. I advised avoiding deep bending, squatting and high intensity activity. I discussed the importance of weight loss to alleviate her knee pain\par \par Discussed follow up with dermatologist for skin lesion. \par \par FU  6 weeks \par \par All questions were answered, all alternatives discussed and the patient is in complete agreement with that plan. Follow-up appointment as instructed. Any issues and the patient will call or come in sooner.

## 2021-08-17 ENCOUNTER — APPOINTMENT (OUTPATIENT)
Dept: ORTHOPEDIC SURGERY | Facility: CLINIC | Age: 86
End: 2021-08-17
Payer: MEDICARE

## 2021-08-17 DIAGNOSIS — S80.12XA CONTUSION OF LEFT LOWER LEG, INITIAL ENCOUNTER: ICD-10-CM

## 2021-08-17 DIAGNOSIS — M79.89 OTHER SPECIFIED SOFT TISSUE DISORDERS: ICD-10-CM

## 2021-08-17 PROCEDURE — 73590 X-RAY EXAM OF LOWER LEG: CPT | Mod: LT

## 2021-08-17 PROCEDURE — 99214 OFFICE O/P EST MOD 30 MIN: CPT

## 2021-08-17 NOTE — HISTORY OF PRESENT ILLNESS
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 At her last visit  on 07/20/2021 patient received a Durolane injection of the left knee noting no relief in symptoms.  on 05/18/2021 patient received a Zilretta injection of the left knee noting good relief in symptoms temporarily. She notes her pain has returned at this time. she notes a severe exacerbation of symptoms on 08/08/2021. she reports loosing her balance and ultimately falling to the ground while supporting her hand on railings causing increased pain and swelling of the left knee. She notes the swelling and bruising have decreased since onset but is still  very bothersome. \par  Prior to this, on 02/05/2021 patient received a corticosteroid injection of her left knee noting good relief in symptoms for 2 months.  On 03/31/2020 she received a corticosteroid injection pes anserine bursa that moderately relieved her symptoms temporarily. The injection provided her with moderate relief of symptoms for about one month. The patient reports that she has been experiencing an aching pain along the medial aspect of her left knee that is constant in nature. Her symptoms are exacerbated with walking, deep bending, and with climbing stairs. Patients pain has since returned and is severe in nature. Patient denies any other complaints at this time.

## 2021-08-17 NOTE — PHYSICAL EXAM
[Normal RLE] : Right Lower Extremity: No scars, rashes, lesions, ulcers, skin intact [Normal LLE] : Left Lower Extremity: No scars, rashes, lesions, ulcers, skin intact [Normal Touch] : sensation intact for touch [Normal] : No swelling, no edema, normal pedal pulses and normal temperature [Obese] : obese [Poor Appearance] : well-appearing [Acute Distress] : not in acute distress [de-identified] : Left Lower Extremity\par o Hip :\par ¦ Inspection/Palpation : greater trochanter and diffuse IT band tenderness, no swelling, no deformity\par ¦ Range of Motion : full and painless in all planes, no crepitus\par ¦ Stability : joint stability intact\par ¦ Strength : hip flexion 5/5\par ¦ Tests and Signs : all tests for stability normal\par o Muscle Tone : tone normal\par o Muscle Bulk : normal muscle bulk present\par o Skin : no erythema, no ecchymosis\par o Sensation : sensation to light touch intact\par o Vascular Exam : no edema, no cyanosis, dorsalis pedis artery pulse 2+, posterior tibial artery pulse 2+\par \par o Knee :\par ¦ Inspection/Palpation : marked medial tenderness to palpation, no effusion, moderate anterior medial proximal tibia soft tissue swelling with mild fluctuance, localized ecchymosis,  no deformity \par ¦ Range of Motion : 0 - 100 degrees, no crepitus \par ¦ Stability : no valgus or varus instability present on provocative testing, Lachman’s Test (-)\par ¦ Strength : flexion and extension 5/5\par o Muscle Bulk : normal muscle bulk present \par o Skin : no erythema, no ecchymosis \par o Sensation : sensation to pin intact\par o Vascular Exam : 1+ edema bilaterally, no cyanosis, dorsalis pedis artery pulse 2+, posterior tibial artery pulse 2+ \par  [de-identified] : o LEFT Tibia and Fibula: AP, and lateral views of the tibia and fibula were obtained, there are no soft tissue abnormalities, no fractures, alignment is normal, moderate knee osteoarthritis, normal bone density, no bony lesions.\par \par

## 2021-08-17 NOTE — DISCUSSION/SUMMARY
[de-identified] : The underlying pathophysiology was reviewed in great detail with the patient as well as the various treatment options, including ice, analgesics, NSAIDs, Physical therapy, steroid injections, bracing ,compression sleeves/stockings, TKR. \par \par Continue home exercise program. \par \par Activity modifications and restrictions were discussed. I advised avoiding deep bending, squatting and high intensity activity. I discussed the importance of weight loss to alleviate her knee pain\par \par I have recommended utilizing a knee sleeve to provide added support and stability.\par \par FU  4 weeks for Zilretta injection of left knee.  \par \par All questions were answered, all alternatives discussed and the patient is in complete agreement with that plan. Follow-up appointment as instructed. Any issues and the patient will call or come in sooner.

## 2021-08-17 NOTE — CONSULT LETTER
[Dear  ___] : Dear  [unfilled], [Consult Letter:] : I had the pleasure of evaluating your patient, [unfilled]. [Please see my note below.] : Please see my note below. [Consult Closing:] : Thank you very much for allowing me to participate in the care of this patient.  If you have any questions, please do not hesitate to contact me. [Sincerely,] : Sincerely, [FreeTextEntry2] : [facility] [FreeTextEntry3] : .js

## 2021-08-31 ENCOUNTER — APPOINTMENT (OUTPATIENT)
Dept: ORTHOPEDIC SURGERY | Facility: CLINIC | Age: 86
End: 2021-08-31
Payer: MEDICARE

## 2021-08-31 PROCEDURE — 20610 DRAIN/INJ JOINT/BURSA W/O US: CPT | Mod: LT

## 2021-08-31 PROCEDURE — 99213 OFFICE O/P EST LOW 20 MIN: CPT | Mod: 25

## 2021-08-31 NOTE — DISCUSSION/SUMMARY
[de-identified] : The underlying pathophysiology was reviewed in great detail with the patient as well as the various treatment options, including ice, analgesics, NSAIDs, Physical therapy, steroid injections, bracing ,compression sleeves/stockings, TKR. \par \par Patient received a corticosteroid injection of the left knee today. \par \par Continue home exercise program. \par \par Activity modifications and restrictions were discussed. I advised avoiding deep bending, squatting and high intensity activity. I discussed the importance of weight loss to alleviate her knee pain\par \par I have recommended utilizing a knee sleeve to provide added support and stability.\par \par She may FU after 2 weeks at the earliest for Zilretta injection of left knee.  \par \par All questions were answered, all alternatives discussed and the patient is in complete agreement with that plan. Follow-up appointment as instructed. Any issues and the patient will call or come in sooner.

## 2021-08-31 NOTE — PROCEDURE
[de-identified] : At this point I recommended a therapeutic injection and under sterile precautions an injection of 4 cc 1% lidocaine with 0.5 cc of Kenalog and 0.5 cc of Dexamethasone - was placed into the joint of the LEFT knee without complication, and after several minutes, the patient felt significant relief.\par \par

## 2021-08-31 NOTE — PHYSICAL EXAM
[Normal RLE] : Right Lower Extremity: No scars, rashes, lesions, ulcers, skin intact [Normal LLE] : Left Lower Extremity: No scars, rashes, lesions, ulcers, skin intact [Normal Touch] : sensation intact for touch [Normal] : No swelling, no edema, normal pedal pulses and normal temperature [Obese] : obese [Poor Appearance] : well-appearing [Acute Distress] : not in acute distress [de-identified] : Left Lower Extremity\par o Hip :\par ¦ Inspection/Palpation : greater trochanter and diffuse IT band tenderness, no swelling, no deformity\par ¦ Range of Motion : full and painless in all planes, no crepitus\par ¦ Stability : joint stability intact\par ¦ Strength : hip flexion 5/5\par ¦ Tests and Signs : all tests for stability normal\par o Muscle Tone : tone normal\par o Muscle Bulk : normal muscle bulk present\par o Skin : no erythema, no ecchymosis\par o Sensation : sensation to light touch intact\par o Vascular Exam : no edema, no cyanosis, dorsalis pedis artery pulse 2+, posterior tibial artery pulse 2+\par \par o Knee :\par ¦ Inspection/Palpation : marked medial tenderness to palpation, no effusion, moderate anterior medial proximal tibia soft tissue swelling with mild fluctuance, localized ecchymosis,  no deformity \par ¦ Range of Motion : 0 - 100 degrees, no crepitus \par ¦ Stability : no valgus or varus instability present on provocative testing, Lachman’s Test (-)\par ¦ Strength : flexion and extension 5/5\par o Muscle Bulk : normal muscle bulk present \par o Skin : no erythema, no ecchymosis \par o Sensation : sensation to pin intact\par o Vascular Exam : 1+ edema bilaterally, no cyanosis, dorsalis pedis artery pulse 2+, posterior tibial artery pulse 2+ \par

## 2021-08-31 NOTE — HISTORY OF PRESENT ILLNESS
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 At her last visit  on 07/20/2021 patient received a Durolane injection of the left knee noting no relief in symptoms.  on 05/18/2021 patient received a Zilretta injection of the left knee noting good relief in symptoms temporarily. She notes her pain has returned at this time. she notes a severe exacerbation of symptoms on 08/08/2021. she reports loosing her balance and ultimately falling to the ground while supporting her hand on railings causing increased pain and swelling of the left knee. She notes the swelling and bruising have decreased since onset but is still  very bothersome. She notes severe increased pain over the past two days. \par  Prior to this, on 02/05/2021 patient received a corticosteroid injection of her left knee noting good relief in symptoms for 2 months. On 03/31/2020 she received a corticosteroid injection pes anserine bursa that moderately relieved her symptoms temporarily. The injection provided her with moderate relief of symptoms for about one month. The patient reports that she has been experiencing an aching pain along the medial aspect of her left knee that is constant in nature. Her symptoms are exacerbated with walking, deep bending, and with climbing stairs. Patients pain has since returned and is severe in nature. Patient denies any other complaints at this time.

## 2021-09-21 ENCOUNTER — APPOINTMENT (OUTPATIENT)
Dept: ORTHOPEDIC SURGERY | Facility: CLINIC | Age: 86
End: 2021-09-21

## 2021-10-08 ENCOUNTER — APPOINTMENT (OUTPATIENT)
Dept: ORTHOPEDIC SURGERY | Facility: CLINIC | Age: 86
End: 2021-10-08
Payer: MEDICARE

## 2021-10-08 DIAGNOSIS — M79.652 PAIN IN LEFT THIGH: ICD-10-CM

## 2021-10-08 PROCEDURE — 99214 OFFICE O/P EST MOD 30 MIN: CPT | Mod: 25

## 2021-10-08 PROCEDURE — 20610 DRAIN/INJ JOINT/BURSA W/O US: CPT | Mod: LT

## 2021-10-08 PROCEDURE — 73552 X-RAY EXAM OF FEMUR 2/>: CPT | Mod: LT

## 2021-10-08 NOTE — PROCEDURE
[de-identified] : At this point I recommended a therapeutic injection and under sterile precautions an injection of 4 cc of Zilretta (Lot: [id number] , Expiration: 11/2021), was placed into the joint of the Left knee without complication.

## 2021-10-08 NOTE — HISTORY OF PRESENT ILLNESS
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 At her last visit  on 07/20/2021 patient received a Durolane injection of the left knee noting no relief in symptoms.  on 05/18/2021 patient received a Zilretta injection of the left knee noting good relief in symptoms temporarily. She notes her pain has returned at this time. she notes a severe exacerbation of symptoms on 08/08/2021. she reports loosing her balance and ultimately falling to the ground while supporting her hand on railings causing increased pain and swelling of the left knee. She notes the swelling and bruising have decreased since onset but is still  very bothersome. She notes left thigh pain for the past few days. Denies injury or trauma to the area. \par  Prior to this, on 02/05/2021 patient received a corticosteroid injection of her left knee noting good relief in symptoms for 2 months. On 03/31/2020 she received a corticosteroid injection pes anserine bursa that moderately relieved her symptoms temporarily. The injection provided her with moderate relief of symptoms for about one month. The patient reports that she has been experiencing an aching pain along the medial aspect of her left knee that is constant in nature. Her symptoms are exacerbated with walking, deep bending, and with climbing stairs. Patients pain has since returned and is severe in nature. Patient denies any other complaints at this time.

## 2021-10-08 NOTE — PHYSICAL EXAM
[Normal RLE] : Right Lower Extremity: No scars, rashes, lesions, ulcers, skin intact [Normal LLE] : Left Lower Extremity: No scars, rashes, lesions, ulcers, skin intact [Normal Touch] : sensation intact for touch [Normal] : No swelling, no edema, normal pedal pulses and normal temperature [Obese] : obese [Poor Appearance] : well-appearing [Acute Distress] : not in acute distress [de-identified] : Left Lower Extremity\par o Hip :\par ¦ Inspection/Palpation : greater trochanter and diffuse IT band tenderness, diffuse thigh tenderness to palpation no swelling, no deformity\par ¦ Range of Motion : full and painless in all planes, no crepitus\par ¦ Stability : joint stability intact\par ¦ Strength : hip flexion 5/5\par ¦ Tests and Signs : all tests for stability normal\par o Muscle Tone : tone normal\par o Muscle Bulk : normal muscle bulk present\par o Skin : no erythema, no ecchymosis\par o Sensation : sensation to light touch intact\par o Vascular Exam : no edema, no cyanosis, dorsalis pedis artery pulse 2+, posterior tibial artery pulse 2+\par \par o Knee :\par ¦ Inspection/Palpation : marked medial tenderness to palpation, no effusion, moderate anterior medial proximal tibia soft tissue swelling with mild fluctuance, localized ecchymosis,  no deformity \par ¦ Range of Motion : 0 - 100 degrees, no crepitus \par ¦ Stability : no valgus or varus instability present on provocative testing, Lachman’s Test (-)\par ¦ Strength : flexion and extension 5/5\par o Muscle Bulk : normal muscle bulk present \par o Skin : no erythema, no ecchymosis \par o Sensation : sensation to pin intact\par o Vascular Exam : 1+ edema bilaterally, no cyanosis, dorsalis pedis artery pulse 2+, posterior tibial artery pulse 2+ \par  [de-identified] : o Left Femur:  AP and lateral views were obtained, there are no soft tissue abnormalities, no fractures, alignment is normal, normal bone density, normal appearing joint spaces, no bony lesions.\par \par

## 2021-10-08 NOTE — DISCUSSION/SUMMARY
[de-identified] : The underlying pathophysiology was reviewed in great detail with the patient as well as the various treatment options, including ice, analgesics, NSAIDs, Physical therapy, steroid injections, bracing ,compression sleeves/stockings, TKR. \par \par Patient received a Zilretta injection of the left knee today. \par \par Continue home exercise program. \par \par Activity modifications and restrictions were discussed. I advised avoiding deep bending, squatting and high intensity activity. I discussed the importance of weight loss to alleviate her knee pain\par \par I have recommended utilizing a knee sleeve to provide added support and stability.\par \par A prescription for Physical Therapy was provided.\par \par She may return in 6 months for another Zilretta injections as per insurance guidelines.\par \par All questions were answered, all alternatives discussed and the patient is in complete agreement with that plan. Follow-up appointment as instructed. Any issues and the patient will call or come in sooner.

## 2021-12-22 ENCOUNTER — TRANSCRIPTION ENCOUNTER (OUTPATIENT)
Age: 86
End: 2021-12-22

## 2022-01-14 ENCOUNTER — APPOINTMENT (OUTPATIENT)
Dept: ORTHOPEDIC SURGERY | Facility: CLINIC | Age: 87
End: 2022-01-14
Payer: MEDICARE

## 2022-01-14 PROCEDURE — 20610 DRAIN/INJ JOINT/BURSA W/O US: CPT | Mod: LT

## 2022-01-14 PROCEDURE — 99213 OFFICE O/P EST LOW 20 MIN: CPT | Mod: 25

## 2022-01-14 NOTE — PHYSICAL EXAM
[Normal RLE] : Right Lower Extremity: No scars, rashes, lesions, ulcers, skin intact [Normal LLE] : Left Lower Extremity: No scars, rashes, lesions, ulcers, skin intact [Normal Touch] : sensation intact for touch [Normal] : No swelling, no edema, normal pedal pulses and normal temperature [Obese] : obese [Poor Appearance] : well-appearing [Acute Distress] : not in acute distress [de-identified] : Left Lower Extremity\par \par o Knee :\par ¦ Inspection/Palpation : marked medial tenderness to palpation, no effusion, moderate anterior medial proximal tibia soft tissue swelling with mild fluctuance, localized ecchymosis,  no deformity \par ¦ Range of Motion : 0 - 100 degrees, no crepitus \par ¦ Stability : no valgus or varus instability present on provocative testing, Lachman’s Test (-)\par ¦ Strength : flexion and extension 5/5\par o Muscle Bulk : normal muscle bulk present \par o Skin : no erythema, no ecchymosis \par o Sensation : sensation to pin intact\par o Vascular Exam : 1+ edema bilaterally, no cyanosis, dorsalis pedis artery pulse 2+, posterior tibial artery pulse 2+ \par  [de-identified] : \par

## 2022-01-14 NOTE — DISCUSSION/SUMMARY
[de-identified] : The underlying pathophysiology was reviewed in great detail with the patient as well as the various treatment options, including ice, analgesics, NSAIDs, Physical therapy, steroid injections, bracing ,compression sleeves/stockings, TKR. \par \par Patient received a Zilretta injection of the left knee today. \par \par Continue home exercise program. \par \par Activity modifications and restrictions were discussed. I advised avoiding deep bending, squatting and high intensity activity. I discussed the importance of weight loss to alleviate her knee pain\par \par I have recommended utilizing a knee sleeve to provide added support and stability.\par \par All questions were answered, all alternatives discussed and the patient is in complete agreement with that plan. Follow-up appointment as instructed. Any issues and the patient will call or come in sooner.

## 2022-01-14 NOTE — PROCEDURE
[de-identified] : At this point I recommended a therapeutic injection and under sterile precautions an injection of 4 cc of Zilretta (Lot: [id number] , Expiration: 12/2022), was placed into the joint of the Left knee without complication.

## 2022-01-14 NOTE — HISTORY OF PRESENT ILLNESS
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 At her last visit  on 07/20/2021 patient received a Durolane injection of the left knee noting no relief in symptoms.  on 05/18/2021 patient received a Zilretta injection of the left knee noting good relief in symptoms temporarily. She notes her pain has returned at this time. she notes a severe exacerbation of symptoms on 08/08/2021. she reports loosing her balance and ultimately falling to the ground while supporting her hand on railings causing increased pain and swelling of the left knee. She notes the swelling and bruising have decreased since onset but is still  very bothersome. She notes left thigh pain for the past few days. Denies injury or trauma to the area. \par  Prior to this, on 02/05/2021 patient received a corticosteroid injection of her left knee noting good relief in symptoms for 2 months. On 03/31/2020 she received a corticosteroid injection pes anserine bursa that moderately relieved her symptoms temporarily. The injection provided her with moderate relief of symptoms for about one month. The patient reports that she has been experiencing an aching pain along the medial aspect of her left knee that is constant in nature. Her symptoms are exacerbated with walking, deep bending, and with climbing stairs. Patients pain has since returned and is severe in nature. Patient denies any other complaints at this time.

## 2022-04-26 ENCOUNTER — APPOINTMENT (OUTPATIENT)
Dept: ORTHOPEDIC SURGERY | Facility: CLINIC | Age: 87
End: 2022-04-26
Payer: MEDICARE

## 2022-04-26 VITALS — WEIGHT: 175 LBS | BODY MASS INDEX: 33.04 KG/M2 | HEIGHT: 61 IN

## 2022-04-26 PROCEDURE — 73564 X-RAY EXAM KNEE 4 OR MORE: CPT | Mod: LT

## 2022-04-26 PROCEDURE — 20610 DRAIN/INJ JOINT/BURSA W/O US: CPT | Mod: LT

## 2022-04-26 PROCEDURE — 99214 OFFICE O/P EST MOD 30 MIN: CPT | Mod: 25

## 2022-04-27 NOTE — HISTORY OF PRESENT ILLNESS
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 At her last visit 01/14/2022,  patient received a left knee Zilretta injection noting minimal relief in symptoms.\par On 07/20/2021 patient received a Durolane injection of the left knee noting no relief in symptoms. On 05/18/2021 patient received a Zilretta injection of the left knee noting good relief in symptoms temporarily. She notes her pain has returned at this time. she notes a severe exacerbation of symptoms on 08/08/2021. she reports loosing her balance and ultimately falling to the ground while supporting her hand on railings causing increased pain and swelling of the left knee. She notes the swelling and bruising have decreased since onset but is still  very bothersome. She notes left thigh pain for the past few days. Denies injury or trauma to the area. \par Prior to this, on 02/05/2021 patient received a corticosteroid injection of her left knee noting good relief in symptoms for 2 months. On 03/31/2020 she received a corticosteroid injection pes anserine bursa that moderately relieved her symptoms temporarily. The injection provided her with moderate relief of symptoms for about one month. The patient reports that she has been experiencing an aching pain along the medial aspect of her left knee that is constant in nature. Her symptoms are exacerbated with walking, deep bending, and with climbing stairs. Patients pain has since returned and is severe in nature. Patient denies any other complaints at this time.

## 2022-04-27 NOTE — DISCUSSION/SUMMARY
[de-identified] : The underlying pathophysiology was reviewed in great detail with the patient as well as the various treatment options, including ice, analgesics, NSAIDs, Physical therapy, steroid injections, bracing ,compression sleeves/stockings, TKR and genicular nerve ablation. \par \par Patient received a Zilretta injection of the left knee today. \par \par Continue home exercise program. \par \par Activity modifications and restrictions were discussed. I advised avoiding deep bending, squatting and high intensity activity. I discussed the importance of weight loss to alleviate her knee pain\par \par I have recommended utilizing a knee sleeve to provide added support and stability.\par \par All questions were answered, all alternatives discussed and the patient is in complete agreement with that plan. Follow-up appointment as instructed. Any issues and the patient will call or come in sooner.

## 2022-04-27 NOTE — PHYSICAL EXAM
[Normal LLE] : Left Lower Extremity: No scars, rashes, lesions, ulcers, skin intact [Normal Touch] : sensation intact for touch [Normal] : Alert and in no acute distress [de-identified] : Left Lower Extremity\par o Knee :\par ¦ Inspection/Palpation : marked medial tenderness to palpation, no effusion, moderate anterior medial proximal tibia soft tissue swelling with mild fluctuance, localized ecchymosis, no deformity \par ¦ Range of Motion : 0 - 100 degrees, no crepitus \par ¦ Stability : no valgus or varus instability present on provocative testing, Lachman’s Test (-)\par ¦ Strength : flexion and extension 5/5\par o Muscle Bulk : normal muscle bulk present \par o Skin : no erythema, no ecchymosis \par o Sensation : sensation to pin intact\par o Vascular Exam : 1+ edema bilaterally, no cyanosis, dorsalis pedis artery pulse 2+, posterior tibial artery pulse 2+  [de-identified] : o Left Knee : AP, lateral, sunrise, and Lam views of the knee were obtained, there are no soft tissue abnormalities, no fractures, alignment is normal, advanced bone on bone osteoarthritis of the medial compartment, normal bone density, no bony lesions.

## 2022-04-27 NOTE — PROCEDURE
[de-identified] : At this point I recommended a therapeutic injection and under sterile precautions an injection of 4 cc of Zilretta (Lot: [id number] , Expiration: 06/2023), was placed into the joint of the Left knee without complication.

## 2022-05-03 ENCOUNTER — APPOINTMENT (OUTPATIENT)
Dept: ORTHOPEDIC SURGERY | Facility: CLINIC | Age: 87
End: 2022-05-03

## 2022-08-02 ENCOUNTER — APPOINTMENT (OUTPATIENT)
Dept: ORTHOPEDIC SURGERY | Facility: CLINIC | Age: 87
End: 2022-08-02

## 2022-08-02 VITALS — HEIGHT: 61 IN | WEIGHT: 180 LBS | BODY MASS INDEX: 33.99 KG/M2

## 2022-08-02 PROCEDURE — 73030 X-RAY EXAM OF SHOULDER: CPT | Mod: 50

## 2022-08-02 PROCEDURE — 99214 OFFICE O/P EST MOD 30 MIN: CPT | Mod: 25

## 2022-08-02 PROCEDURE — 20610 DRAIN/INJ JOINT/BURSA W/O US: CPT | Mod: LT

## 2022-08-02 NOTE — ADDENDUM
[FreeTextEntry1] : I, Sushil Howe, acted solely as a scribe for Dr. Austin Luna on this date 08/02/2022.

## 2022-08-02 NOTE — PHYSICAL EXAM
[Normal RUE] : Right Upper Extremity: No scars, rashes, lesions, ulcers, skin intact [Normal LUE] : Left Upper Extremity: No scars, rashes, lesions, ulcers, skin intact [Normal RLE] : Right Lower Extremity: No scars, rashes, lesions, ulcers, skin intact [Normal LLE] : Left Lower Extremity: No scars, rashes, lesions, ulcers, skin intact [Normal Touch] : sensation intact for touch [Normal] : Oriented to person, place, and time, insight and judgement were intact and the affect was normal [de-identified] : Left Lower Extremity\par o Knee :\par ¦ Inspection/Palpation : marked medial tenderness to palpation, no effusion, moderate anterior medial proximal tibia soft tissue swelling with mild fluctuance, localized ecchymosis, no deformity \par ¦ Range of Motion : 0 - 100 degrees, no crepitus \par ¦ Stability : no valgus or varus instability present on provocative testing, Lachman’s Test (-)\par ¦ Strength : flexion and extension 5/5\par o Muscle Bulk : normal muscle bulk present \par o Skin : no erythema, no ecchymosis \par o Sensation : sensation to pin intact\par o Vascular Exam : 1+ edema bilaterally, no cyanosis, dorsalis pedis artery pulse 2+, posterior tibial artery pulse 2+ \par \par Right Upper Extremity\par o Shoulder :\par ¦ Inspection/Palpation : no tenderness, no swelling, no deformities\par ¦ Range of Motion : ACTIVE FORWARD ELEVATION: Measured at 140 degrees, ACTIVE EXTERNAL ROTATION: Measured at 60 degrees, ACTIVE INTERNAL ROTATION: Measured at L4\par ¦ Strength : external rotation 5/5, internal rotation 5/5, supraspinatus 5/5\par ¦ Stability : no joint instability on provocative testing\par ¦ Tests/Signs : Neer (-), Parr (-)\par o Upper Arm : no tenderness, no swelling, no deformities\par o Muscle Bulk : no atrophy\par o Sensation : sensation intact to light touch\par o Skin : no skin rash or discoloration\par o Vascular Exam : no edema, no cyanosis, radial and ulnar pulses normal\par \par Left Upper Extremity\par o Shoulder :\par ¦ Inspection/Palpation : no tenderness, no swelling, no deformities\par ¦ Range of Motion : ACTIVE FORWARD ELEVATION: Measured at 115 degrees, ACTIVE EXTERNAL ROTATION: Measured at 50 degrees, ACTIVE INTERNAL ROTATION: Measured at PSIS\par ¦ Strength : external rotation 5/5, internal rotation 5/5, supraspinatus 5/5\par ¦ Stability : no joint instability on provocative testing\par ¦ Tests/Signs : Neer (-), Parr (-)\par o Upper Arm : no tenderness, no swelling, no deformities\par o Muscle Bulk : no atrophy\par o Sensation : sensation intact to light touch\par o Skin : no skin rash or discoloration\par o Vascular Exam : no edema, no cyanosis, radial and ulnar pulses normal  [de-identified] : o Right Shoulder : Grashey, Axillary, and Outlet views were obtained, there are no soft tissue abnormalities, no fractures, alignment is normal, moderate glenohumeral osteoarthritis, normal bone density, no bony lesions. \par \par o Left Shoulder : Grashey, Axillary, and Outlet views were obtained, there are no soft tissue abnormalities, no fractures, alignment is normal, severe glenohumeral osteoarthritis, normal bone density, no bony lesions.

## 2022-08-02 NOTE — PROCEDURE
[de-identified] : At this point I recommended a therapeutic injection and under sterile precautions an injection of 4 cc of Monovisc (Lot: [id number] , Expiration: 10/31/2024 ), was placed into the joint of the left knee without complication

## 2022-08-02 NOTE — DISCUSSION/SUMMARY
[de-identified] : The underlying pathophysiology was reviewed in great detail with the patient as well as the various treatment options, including ice, analgesics, NSAIDs, Physical therapy, steroid injections, bracing ,compression sleeves/stockings, TKR and genicular nerve ablation. \par \par The patient elected to receive a Monovisc injection into the left knee today and tolerated it well. I instructed the patient on ROM exercises, and told them to take it easy. The use of ice and rest was reviewed with the patient. The patient may resume activities in a couple of days. I reminded the patient that it takes 4 to 6 weeks after the injection to feel symptom relief \par \par Activity modifications and restrictions were discussed. I advised avoiding overhead lifting. I advised the patient to work on good posture. \par \par Continue with home exercise program, weight loss. 	\par \par Patient may return in 6 months for another series of gel injections as per insurance guidelines. A cortisone injection may be administered during the interim period if he cannot tolerate such a wait.  \par \par All questions were answered, all alternatives discussed and the patient is in complete agreement with that plan. Follow-up appointment as instructed. Any issues and the patient will call or come in sooner.

## 2022-08-02 NOTE — HISTORY OF PRESENT ILLNESS
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 At her last visit 04/26/2022,  patient received a left knee Zilretta injection noting good relief in symptoms. She is interested in gel injection today. She is also c/o bilateral shoulder pain that is constant and achy, worse with movement and forward elevation.\par On 07/20/2021 patient received a Durolane injection of the left knee noting no relief in symptoms. On 05/18/2021 patient received a Zilretta injection of the left knee noting good relief in symptoms temporarily. She notes her pain has returned at this time. she notes a severe exacerbation of symptoms on 08/08/2021. she reports loosing her balance and ultimately falling to the ground while supporting her hand on railings causing increased pain and swelling of the left knee. She notes the swelling and bruising have decreased since onset but is still  very bothersome. She notes left thigh pain for the past few days. Denies injury or trauma to the area. \par Prior to this, on 02/05/2021 patient received a corticosteroid injection of her left knee noting good relief in symptoms for 2 months. On 03/31/2020 she received a corticosteroid injection pes anserine bursa that moderately relieved her symptoms temporarily. The injection provided her with moderate relief of symptoms for about one month. The patient reports that she has been experiencing an aching pain along the medial aspect of her left knee that is constant in nature. Her symptoms are exacerbated with walking, deep bending, and with climbing stairs. Patients pain has since returned and is severe in nature. Patient denies any other complaints at this time.

## 2022-09-23 ENCOUNTER — APPOINTMENT (OUTPATIENT)
Dept: ORTHOPEDIC SURGERY | Facility: CLINIC | Age: 87
End: 2022-09-23

## 2022-09-23 PROCEDURE — 99214 OFFICE O/P EST MOD 30 MIN: CPT | Mod: 25

## 2022-09-23 PROCEDURE — 20610 DRAIN/INJ JOINT/BURSA W/O US: CPT | Mod: LT

## 2022-09-23 PROCEDURE — 76882 US LMTD JT/FCL EVL NVASC XTR: CPT | Mod: 59

## 2022-12-23 ENCOUNTER — APPOINTMENT (OUTPATIENT)
Dept: ORTHOPEDIC SURGERY | Facility: CLINIC | Age: 87
End: 2022-12-23

## 2022-12-23 PROCEDURE — 20610 DRAIN/INJ JOINT/BURSA W/O US: CPT | Mod: LT

## 2022-12-23 PROCEDURE — 99214 OFFICE O/P EST MOD 30 MIN: CPT | Mod: 25

## 2023-03-24 ENCOUNTER — APPOINTMENT (OUTPATIENT)
Dept: ORTHOPEDIC SURGERY | Facility: CLINIC | Age: 88
End: 2023-03-24
Payer: MEDICARE

## 2023-03-24 PROCEDURE — 20610 DRAIN/INJ JOINT/BURSA W/O US: CPT | Mod: LT

## 2023-03-24 PROCEDURE — 99214 OFFICE O/P EST MOD 30 MIN: CPT | Mod: 25

## 2023-04-25 ENCOUNTER — APPOINTMENT (OUTPATIENT)
Dept: ORTHOPEDIC SURGERY | Facility: CLINIC | Age: 88
End: 2023-04-25

## 2023-05-05 ENCOUNTER — APPOINTMENT (OUTPATIENT)
Dept: ORTHOPEDIC SURGERY | Facility: CLINIC | Age: 88
End: 2023-05-05
Payer: MEDICARE

## 2023-05-05 VITALS
WEIGHT: 180 LBS | DIASTOLIC BLOOD PRESSURE: 70 MMHG | HEIGHT: 61 IN | BODY MASS INDEX: 33.99 KG/M2 | RESPIRATION RATE: 69 BRPM | SYSTOLIC BLOOD PRESSURE: 130 MMHG

## 2023-05-05 PROCEDURE — 99214 OFFICE O/P EST MOD 30 MIN: CPT | Mod: 25

## 2023-05-05 PROCEDURE — 20611 DRAIN/INJ JOINT/BURSA W/US: CPT | Mod: LT

## 2023-06-27 ENCOUNTER — APPOINTMENT (OUTPATIENT)
Dept: ORTHOPEDIC SURGERY | Facility: CLINIC | Age: 88
End: 2023-06-27
Payer: MEDICARE

## 2023-06-27 VITALS — WEIGHT: 170 LBS | HEIGHT: 61 IN | BODY MASS INDEX: 32.1 KG/M2

## 2023-06-27 PROCEDURE — 99214 OFFICE O/P EST MOD 30 MIN: CPT | Mod: 25

## 2023-06-27 PROCEDURE — 20611 DRAIN/INJ JOINT/BURSA W/US: CPT | Mod: LT

## 2023-06-27 PROCEDURE — 20610 DRAIN/INJ JOINT/BURSA W/O US: CPT | Mod: 59,LT

## 2023-07-28 ENCOUNTER — APPOINTMENT (OUTPATIENT)
Dept: ORTHOPEDIC SURGERY | Facility: CLINIC | Age: 88
End: 2023-07-28
Payer: MEDICARE

## 2023-07-28 VITALS — HEIGHT: 61 IN | WEIGHT: 170 LBS | BODY MASS INDEX: 32.1 KG/M2

## 2023-07-28 PROCEDURE — 99214 OFFICE O/P EST MOD 30 MIN: CPT | Mod: 25

## 2023-07-28 PROCEDURE — 20611 DRAIN/INJ JOINT/BURSA W/US: CPT | Mod: LT

## 2023-08-23 ENCOUNTER — APPOINTMENT (OUTPATIENT)
Dept: ORTHOPEDIC SURGERY | Facility: CLINIC | Age: 88
End: 2023-08-23
Payer: MEDICARE

## 2023-08-23 VITALS — HEIGHT: 61 IN | WEIGHT: 170 LBS | BODY MASS INDEX: 32.1 KG/M2

## 2023-08-23 PROCEDURE — 99213 OFFICE O/P EST LOW 20 MIN: CPT | Mod: 25

## 2023-08-23 PROCEDURE — 20610 DRAIN/INJ JOINT/BURSA W/O US: CPT | Mod: RT

## 2023-09-01 ENCOUNTER — APPOINTMENT (OUTPATIENT)
Dept: ORTHOPEDIC SURGERY | Facility: CLINIC | Age: 88
End: 2023-09-01

## 2023-09-05 ENCOUNTER — APPOINTMENT (OUTPATIENT)
Dept: ORTHOPEDIC SURGERY | Facility: CLINIC | Age: 88
End: 2023-09-05
Payer: MEDICARE

## 2023-09-05 PROCEDURE — 20610 DRAIN/INJ JOINT/BURSA W/O US: CPT | Mod: RT

## 2023-09-05 PROCEDURE — 99214 OFFICE O/P EST MOD 30 MIN: CPT | Mod: 25

## 2023-09-15 ENCOUNTER — APPOINTMENT (OUTPATIENT)
Dept: ORTHOPEDIC SURGERY | Facility: CLINIC | Age: 88
End: 2023-09-15
Payer: MEDICARE

## 2023-09-15 PROCEDURE — 99214 OFFICE O/P EST MOD 30 MIN: CPT

## 2023-10-10 ENCOUNTER — APPOINTMENT (OUTPATIENT)
Dept: ORTHOPEDIC SURGERY | Facility: CLINIC | Age: 88
End: 2023-10-10
Payer: MEDICARE

## 2023-10-10 VITALS — BODY MASS INDEX: 32.1 KG/M2 | HEIGHT: 61 IN | WEIGHT: 170 LBS

## 2023-10-10 PROCEDURE — 20610 DRAIN/INJ JOINT/BURSA W/O US: CPT | Mod: LT

## 2023-10-10 PROCEDURE — 99214 OFFICE O/P EST MOD 30 MIN: CPT | Mod: 25

## 2023-10-16 RX ORDER — TRAMADOL HYDROCHLORIDE 50 MG/1
50 TABLET, COATED ORAL TWICE DAILY
Qty: 40 | Refills: 0 | Status: ACTIVE | COMMUNITY
Start: 2023-10-03 | End: 1900-01-01

## 2023-10-31 ENCOUNTER — APPOINTMENT (OUTPATIENT)
Dept: ORTHOPEDIC SURGERY | Facility: CLINIC | Age: 88
End: 2023-10-31
Payer: MEDICARE

## 2023-10-31 VITALS — BODY MASS INDEX: 32.1 KG/M2 | HEIGHT: 61 IN | WEIGHT: 170 LBS

## 2023-10-31 DIAGNOSIS — G89.29 LOW BACK PAIN, UNSPECIFIED: ICD-10-CM

## 2023-10-31 DIAGNOSIS — M70.62 TROCHANTERIC BURSITIS, LEFT HIP: ICD-10-CM

## 2023-10-31 DIAGNOSIS — M54.50 LOW BACK PAIN, UNSPECIFIED: ICD-10-CM

## 2023-10-31 DIAGNOSIS — M84.451A PATHOLOGICAL FRACTURE, RIGHT FEMUR, INITIAL ENCOUNTER FOR FRACTURE: ICD-10-CM

## 2023-10-31 PROCEDURE — 20610 DRAIN/INJ JOINT/BURSA W/O US: CPT | Mod: 50

## 2023-10-31 PROCEDURE — 99214 OFFICE O/P EST MOD 30 MIN: CPT | Mod: 25

## 2023-10-31 PROCEDURE — 73564 X-RAY EXAM KNEE 4 OR MORE: CPT | Mod: LT

## 2023-10-31 PROCEDURE — 73502 X-RAY EXAM HIP UNI 2-3 VIEWS: CPT | Mod: LT

## 2023-11-07 ENCOUNTER — APPOINTMENT (OUTPATIENT)
Dept: ORTHOPEDIC SURGERY | Facility: CLINIC | Age: 88
End: 2023-11-07
Payer: MEDICARE

## 2023-11-07 VITALS — HEIGHT: 61 IN | WEIGHT: 165 LBS | BODY MASS INDEX: 31.15 KG/M2

## 2023-11-07 PROCEDURE — 99215 OFFICE O/P EST HI 40 MIN: CPT

## 2023-11-20 RX ORDER — CELECOXIB 200 MG/1
200 CAPSULE ORAL
Qty: 180 | Refills: 0 | Status: ACTIVE | COMMUNITY
Start: 2023-09-20 | End: 1900-01-01

## 2023-11-28 ENCOUNTER — APPOINTMENT (OUTPATIENT)
Dept: ORTHOPEDIC SURGERY | Facility: CLINIC | Age: 88
End: 2023-11-28
Payer: MEDICARE

## 2023-11-28 PROCEDURE — 99214 OFFICE O/P EST MOD 30 MIN: CPT

## 2023-11-30 RX ORDER — ASPIRIN 325 MG/1
TABLET, FILM COATED ORAL
Refills: 0 | Status: ACTIVE | COMMUNITY

## 2023-11-30 RX ORDER — CALCITONIN SALMON 200 [IU]/1
200 SPRAY, METERED NASAL DAILY
Qty: 1 | Refills: 1 | Status: DISCONTINUED | COMMUNITY
Start: 2023-09-20 | End: 2023-11-30

## 2023-12-22 ENCOUNTER — APPOINTMENT (OUTPATIENT)
Dept: ORTHOPEDIC SURGERY | Facility: CLINIC | Age: 88
End: 2023-12-22
Payer: MEDICARE

## 2023-12-22 VITALS — BODY MASS INDEX: 32.1 KG/M2 | WEIGHT: 170 LBS | HEIGHT: 61 IN

## 2023-12-22 DIAGNOSIS — S49.91XA UNSPECIFIED INJURY OF RIGHT SHOULDER AND UPPER ARM, INITIAL ENCOUNTER: ICD-10-CM

## 2023-12-22 PROCEDURE — 73030 X-RAY EXAM OF SHOULDER: CPT | Mod: RT

## 2023-12-22 PROCEDURE — 20610 DRAIN/INJ JOINT/BURSA W/O US: CPT | Mod: 59,RT

## 2023-12-22 PROCEDURE — 99214 OFFICE O/P EST MOD 30 MIN: CPT | Mod: 25

## 2023-12-23 PROBLEM — S49.91XA RIGHT SHOULDER INJURY: Status: ACTIVE | Noted: 2023-12-22

## 2023-12-23 NOTE — PHYSICAL EXAM
[de-identified] : Right Upper Extremity o Shoulder :  Inspection/Palpation : no swelling, no deformities, tender greater tuberosity   Range of Motion : ACTIVE FORWARD ELEVATION: Measured at 100 degrees, ACTIVE EXTERNAL ROTATION: Measured at 15 degrees, ACTIVE INTERNAL ROTATION: Measured at PSIS  Strength : external rotation 3/5 with pain, internal rotation 5/5, supraspinatus 5/5  Stability : no joint instability on provocative testing  Tests/Signs : Neer (-), Parr (-) o Upper Arm : no tenderness, no swelling, no deformities o Muscle Bulk : no atrophy o Sensation : sensation intact to light touch o Skin : no skin rash or discoloration o Vascular Exam : no edema, no cyanosis, radial and ulnar pulses normal  Right Lower Extremity o Knee : Inspection/Palpation : diffuse medial tenderness to palpation, no swelling Range of Motion : active flexion and extension 0-110 degrees, no crepitance Stability : no valgus or varus instability present on provocative testing, Lachman (-) Strength : quads 4/5 o Muscle Bulk : normal muscle bulk present o Skin : no erythema or ecchymosis present o Sensation : sensation to pin intact o Vascular Exam : no edema, no cyanosis, dorsalis pedis artery pulse 2+, posterior tibial artery pulse 2+  Left Lower Extremity o Knee : Inspection/Palpation : marked medial joint line tenderness over the medial joint line, 1+ effusion swelling, no deformity Range of Motion : 0 - 120 degrees, no crepitus Stability : no valgus or varus instability present on provocative testing, Lachmans Test (-) Strength : flexion and extension 5/5 o Muscle Bulk : normal muscle bulk present o Skin : no erythema, no ecchymosis o Sensation : sensation to pin intact o Vascular Exam : no edema, no cyanosis, dorsalis pedis artery pulse 2+, posterior tibial artery pulse 2+   Musculoskeletal: ambulates with cane. [de-identified] : X-rays obtained on 12/22/2023:  o Right Shoulder : Grashey, axillary and outlet views were obtained, there are no soft tissue abnormalities, alignment is normal, no fractures, moderate GH DJD, marked AC DJD, mild degenerative changes at the greater tuberosity, normal bone density, no bony lesions  X-rays obtained on 10/31/2023:  o Left Hip and pelvis : AP and lateral views were obtained, there are no soft tissue abnormalities, no fractures, alignment is normal, normal bone density, mild osteoarthritis of the acetabular joint, no bony lesions.    o Left Knee : AP, lateral, sunrise, and Lam views of the knee were obtained, there are no soft tissue abnormalities, no fractures, alignment is normal, severe tricompartmental osteoarthritis with bone-on-bone apposition, normal bone density, no bony lesions. ________________________________________________________________________________ MRI results of the RIGHT knee obtained on 09/19/2023 demonstrated diffuse marrow edema throughout the tibial metaphysis in the setting of a subchondral microintertrabecular insufficiency fracture of the medial tibial plateau. Complex degenerative tearing of the medial meniscal posterior horn and root attachment, associated with extrusion of the meniscal body segment into the medial gutter. Degeneration and fraying along the free margin of the lateral meniscal body without displaced meniscal tear. Tricompartmental osteoarthritis and chondral wear, most advanced in the patellofemoral compartment where there is full thickness chondral loss along the medial patellar facet as described. Small joint effusion.

## 2023-12-23 NOTE — HISTORY OF PRESENT ILLNESS
[de-identified] : ADRIENNE FRIAS is a 88 year old female who presents for evaluation of chronic bilateral knee pain and right shoulder pain. She presents ambulating with a cane. She previously has seen a pain management specialist at [facility]. Her pain worsens when she lifts the leg, prolonged walking and has constant discomfort of the knee. She has no relief with taking Tramadol. Patient has received multiple left knee Zilretta injections with good temporary relief in symptoms. On 03/24/2023, left knee aspiration and Zilretta injection which provided good temporary relief. On 05/05/2023, she received a left knee Monovisc injection with good relief. On 6/27/2023, she received a left knee aspiration and Zilretta injection which provided good relief. On 07/28/2023, she received a left knee aspiration injection with good relief. She received a left knee Zilretta injection on 10/10/2023 with no relief.  Since her last visit on 11/28/2023, she still reports pain, but her right knee pain is worsening.  Of note PMHx of cardiac problems and is unable to take NSAIDs.

## 2023-12-23 NOTE — DISCUSSION/SUMMARY
[de-identified] : The underlying pathophysiology was reviewed in great detail with the patient as well as the various treatment options, including ice, analgesics, NSAIDs, Physical therapy, steroid injections, bracing, compression sleeves/stockings, surgery - TKA and genicular nerve ablation.  Activity modifications and restrictions were discussed. I advised avoiding deep bending, squatting and high intensity activity. A home exercise sheet was given and discussed with the patient to follow.   The patient elected to receive a cortisone injection into his right knee and right shoulder today and tolerated it well. I instructed the patient on ROM exercises, and told them to take it easy. The use of ice and rest was reviewed with the patient. The patient may resume activities in a couple of days.  I recommended referral to specialists for a surgical consultation of a left TKA.  FU if symptoms worsen.   All questions were answered, all alternatives discussed and the patient is in complete agreement with that plan. Follow-up appointment as instructed. Any issues and the patient will call or come in sooner.

## 2023-12-23 NOTE — PROCEDURE
[de-identified] : At this point I recommended a therapeutic injection and under sterile precautions an injection of 4 cc 1% lidocaine with 0.5 cc of Kenalog and 0.5 cc of Dexamethasone- was placed into the subacromial space of the Right shoulder without complication, and after several minutes, the patient felt significant relief.  At this point I recommended a therapeutic injection and under sterile precautions an injection of 5 cc 1% lidocaine with 0.5 cc of Kenalog and 0.5 cc of Dexamethasone - was placed into the joint of the Right knee without complication, and after several minutes, the patient felt significant relief.

## 2023-12-23 NOTE — ADDENDUM
[FreeTextEntry1] :   I, Karena Norma, acted as a scribe on behalf of Dr. Austin Luna on 12/22/2023 .   All medical entries made by the scribe were at my, Dr. Austin Luna, direction and personally dictated by me on 12/22/2023 .. I have reviewed the chart and agree that the record accurately reflects my personal performance of the history, physical exam, assessment and plan. I have also personally directed, reviewed, and agreed with the chart.

## 2024-01-19 ENCOUNTER — APPOINTMENT (OUTPATIENT)
Dept: ORTHOPEDIC SURGERY | Facility: CLINIC | Age: 89
End: 2024-01-19
Payer: MEDICARE

## 2024-01-19 VITALS — WEIGHT: 170 LBS | BODY MASS INDEX: 32.1 KG/M2 | HEIGHT: 61 IN

## 2024-01-19 PROCEDURE — 99214 OFFICE O/P EST MOD 30 MIN: CPT | Mod: 25

## 2024-01-19 PROCEDURE — 20610 DRAIN/INJ JOINT/BURSA W/O US: CPT | Mod: LT

## 2024-03-01 ENCOUNTER — APPOINTMENT (OUTPATIENT)
Dept: ORTHOPEDIC SURGERY | Facility: CLINIC | Age: 89
End: 2024-03-01
Payer: MEDICARE

## 2024-03-01 VITALS — HEIGHT: 61 IN | WEIGHT: 175 LBS | BODY MASS INDEX: 33.04 KG/M2

## 2024-03-01 PROCEDURE — 20610 DRAIN/INJ JOINT/BURSA W/O US: CPT | Mod: RT

## 2024-03-01 PROCEDURE — 99214 OFFICE O/P EST MOD 30 MIN: CPT | Mod: 25

## 2024-03-27 ENCOUNTER — APPOINTMENT (OUTPATIENT)
Dept: ORTHOPEDIC SURGERY | Facility: CLINIC | Age: 89
End: 2024-03-27
Payer: MEDICARE

## 2024-03-27 VITALS — WEIGHT: 175 LBS | HEIGHT: 61 IN | BODY MASS INDEX: 33.04 KG/M2

## 2024-03-27 DIAGNOSIS — M19.071 PRIMARY OSTEOARTHRITIS, RIGHT ANKLE AND FOOT: ICD-10-CM

## 2024-03-27 DIAGNOSIS — M19.012 PRIMARY OSTEOARTHRITIS, LEFT SHOULDER: ICD-10-CM

## 2024-03-27 PROCEDURE — 99214 OFFICE O/P EST MOD 30 MIN: CPT | Mod: 25

## 2024-03-27 PROCEDURE — 20610 DRAIN/INJ JOINT/BURSA W/O US: CPT | Mod: LT

## 2024-03-27 NOTE — DISCUSSION/SUMMARY
[de-identified] : The underlying pathophysiology was reviewed in great detail with the patient as well as the various treatment options, including ice, analgesics, NSAIDs, Physical therapy, steroid injections, bracing, compression sleeves/stockings, surgery - TKA and genicular nerve ablation  The patient elected to receive a LEFT SHOULDER cortisone injection and tolerated it well. I instructed the patient on ROM exercises, and told them to take it easy. The use of ice and rest was reviewed with the patient. The patient may resume activities in a couple of days.  Activity modifications and restrictions were discussed. I advised avoiding deep bending, squatting and high intensity activity. A home exercise sheet was given and discussed with the patient to follow.   All questions were answered, all alternatives discussed and the patient is in complete agreement with that plan. Follow-up appointment as instructed. Any issues and the patient will call or come in sooner.

## 2024-03-27 NOTE — PHYSICAL EXAM
[Cane] : ambulates with cane [Normal RUE] : Right Upper Extremity: No scars, rashes, lesions, ulcers, skin intact [Normal LUE] : Left Upper Extremity: No scars, rashes, lesions, ulcers, skin intact [Normal RLE] : Right Lower Extremity: No scars, rashes, lesions, ulcers, skin intact [Normal LLE] : Left Lower Extremity: No scars, rashes, lesions, ulcers, skin intact [Normal Touch] : sensation intact for touch [Normal] : No swelling, no edema, normal pedal pulses and normal temperature [de-identified] : Left Upper Extremity o Shoulder :  Inspection/Palpation : no swelling, no deformities, tender greater tuberosity, crepitus  Range of Motion : ACTIVE FORWARD ELEVATION: Measured at 100 degrees, ACTIVE EXTERNAL ROTATION: Measured at 30 degrees, ACTIVE INTERNAL ROTATION: Measured at PSIS  Strength : external rotation 3/5 with pain, internal rotation 5/5, supraspinatus 5/5  Stability : no joint instability on provocative testing  Tests/Signs : Neer (-), Parr (-) o Upper Arm : no tenderness, no swelling, no deformities o Muscle Bulk : no atrophy o Sensation : sensation intact to light touch o Skin : no skin rash or discoloration o Vascular Exam : no edema, no cyanosis, radial and ulnar pulses normal  Right Lower Extremity o Knee : Inspection/Palpation : diffuse medial tenderness to palpation, 1-2+ effusion Range of Motion : active flexion and extension 0-110 degrees, no crepitus Stability : no valgus or varus instability present on provocative testing, Lachman (-) Strength : quads 4/5 o Muscle Bulk : normal muscle bulk present o Skin : no erythema or ecchymosis present o Sensation : sensation to pin intact o Vascular Exam : no edema, no cyanosis, dorsalis pedis artery pulse 2+, posterior tibial artery pulse 2+  Left Lower Extremity o Knee : Inspection/Palpation : marked medial and lateral joint line tenderness over the medial joint line, 1-2+ effusion swelling, no deformity Range of Motion : 0 - 110 degrees, no crepitus Stability : no valgus or varus instability present on provocative testing, Lachmans Test (-) Strength : flexion and extension 5/5 o Muscle Bulk : normal muscle bulk present o Skin : no erythema, no ecchymosis o Sensation : sensation to pin intact o Vascular Exam : no edema, no cyanosis, dorsalis pedis artery pulse 2+, posterior tibial artery pulse 2+   Musculoskeletal: ambulates with cane. [de-identified] : X-rays obtained on 12/22/2023:  o Right Shoulder : Grashey, axillary and outlet views were obtained, there are no soft tissue abnormalities, alignment is normal, no fractures, moderate GH DJD, marked AC DJD, mild degenerative changes at the greater tuberosity, normal bone density, no bony lesions _____________________________ X-rays obtained on 10/31/2023:  o Left Hip and pelvis : AP and lateral views were obtained, there are no soft tissue abnormalities, no fractures, alignment is normal, normal bone density, mild osteoarthritis of the acetabular joint, no bony lesions.    o Left Knee : AP, lateral, sunrise, and Lam views of the knee were obtained, there are no soft tissue abnormalities, no fractures, alignment is normal, severe tricompartmental osteoarthritis with bone-on-bone apposition, normal bone density, no bony lesions. _____________________________ o MRI results of the RIGHT knee obtained on 09/19/2023 demonstrated :  Diffuse marrow edema throughout the tibial metaphysis in the setting of a subchondral microintertrabecular insufficiency fracture of the medial tibial plateau. Complex degenerative tearing of the medial meniscal posterior horn and root attachment, associated with extrusion of the meniscal body segment into the medial gutter. Degeneration and fraying along the free margin of the lateral meniscal body without displaced meniscal tear. Tricompartmental osteoarthritis and chondral wear, most advanced in the patellofemoral compartment where there is full thickness chondral loss along the medial patellar facet as described. Small joint effusion.

## 2024-03-27 NOTE — HISTORY OF PRESENT ILLNESS
[de-identified] : ADRIENNE FRIAS is a 88 year old female who presents for evaluation of chronic bilateral knee pain and shoulder pain. She presents ambulating with a cane. She previously has seen a pain management specialist at [facility]. Her pain worsens when she lifts the leg, prolonged walking and has constant discomfort of the knee. She has no relief with taking Tramadol. She has been having worsening right knee pain as a result of compensating for the left knee.  Patient has received multiple left knee Zilretta injections with good temporary relief in symptoms. On 03/24/2023, left knee aspiration and Zilretta injection which provided good temporary relief. On 05/05/2023, she received a left knee Monovisc injection with good relief. On 6/27/2023, she received a left knee aspiration and Zilretta injection which provided good relief. On 07/28/2023, she received a left knee aspiration injection with good relief. On 09/05/2023, he received a right knee pes anserine bursa cortisone injection with good relief.  She received a left knee Zilretta injection and a left shoulder cortisone injection on 10/10/2023 with no relief.  On 10/31/2023, she received a left knee aspiration injection and right knee cortisone injection with good relief.  On 12/22/2023, she received a right knee cortisone injection and a right shoulder injection with moderate relief. Since her last visit on 01/19/2024, she received a left knee Zilretta and aspiration injection with good relief. As of her last visit on 3/1/24, she had worsening right knee and right shoulder pain, she requested Zilretta injections. She is here today, 3/27/24, seeking another cortisone injection for her left shoulder. She also complains of swelling and pain in her left knee. Of note PMHx of cardiac problems and is unable to take NSAIDs.

## 2024-03-27 NOTE — PROCEDURE
[FreeTextEntry1] : At this point I recommended a therapeutic injection and under sterile precautions an injection of 4 cc 1% lidocaine with 0.5 cc of Kenalog and 0.5 cc of Dexamethasone- was placed into the glenohumeral joint of the left shoulder without complication, and after several minutes, the patient felt significant relief.
Toxicology

## 2024-03-27 NOTE — ADDENDUM
[FreeTextEntry1] : I, Oscar Joselito, acted solely as a scribe for Dr. Austin Luna MD on this date 3/27/24.   All medical record entries made by the Scribe were at my, Mcclain MD , direction and personally dictated by me on 3/27/24. I have reviewed the chart and agree that the record accurately reflects my personal performance of the history, physical exam, assessment and plan. I have also personally directed, reviewed, and agreed with the chart.

## 2024-04-19 ENCOUNTER — APPOINTMENT (OUTPATIENT)
Dept: ORTHOPEDIC SURGERY | Facility: CLINIC | Age: 89
End: 2024-04-19
Payer: MEDICARE

## 2024-04-19 VITALS — HEIGHT: 61 IN | BODY MASS INDEX: 33.04 KG/M2 | WEIGHT: 175 LBS

## 2024-04-19 DIAGNOSIS — M19.011 PRIMARY OSTEOARTHRITIS, RIGHT SHOULDER: ICD-10-CM

## 2024-04-19 DIAGNOSIS — M19.012 PRIMARY OSTEOARTHRITIS, RIGHT SHOULDER: ICD-10-CM

## 2024-04-19 PROCEDURE — 73030 X-RAY EXAM OF SHOULDER: CPT | Mod: LT

## 2024-04-19 PROCEDURE — 20610 DRAIN/INJ JOINT/BURSA W/O US: CPT | Mod: LT

## 2024-04-19 PROCEDURE — 99214 OFFICE O/P EST MOD 30 MIN: CPT | Mod: 25

## 2024-05-01 ENCOUNTER — APPOINTMENT (OUTPATIENT)
Dept: ORTHOPEDIC SURGERY | Facility: CLINIC | Age: 89
End: 2024-05-01
Payer: MEDICARE

## 2024-05-01 VITALS — HEIGHT: 61 IN | BODY MASS INDEX: 33.04 KG/M2 | WEIGHT: 175 LBS

## 2024-05-01 DIAGNOSIS — M17.12 UNILATERAL PRIMARY OSTEOARTHRITIS, LEFT KNEE: ICD-10-CM

## 2024-05-01 PROCEDURE — 73564 X-RAY EXAM KNEE 4 OR MORE: CPT | Mod: RT

## 2024-05-01 PROCEDURE — 99214 OFFICE O/P EST MOD 30 MIN: CPT | Mod: 25

## 2024-05-01 PROCEDURE — 20610 DRAIN/INJ JOINT/BURSA W/O US: CPT | Mod: RT

## 2024-05-22 RX ORDER — MELOXICAM 15 MG/1
15 TABLET ORAL
Qty: 30 | Refills: 1 | Status: ACTIVE | COMMUNITY
Start: 2023-11-20 | End: 1900-01-01

## 2024-05-24 ENCOUNTER — APPOINTMENT (OUTPATIENT)
Dept: ORTHOPEDIC SURGERY | Facility: CLINIC | Age: 89
End: 2024-05-24
Payer: MEDICARE

## 2024-05-24 VITALS — BODY MASS INDEX: 33.04 KG/M2 | HEIGHT: 61 IN | WEIGHT: 175 LBS

## 2024-05-24 DIAGNOSIS — M17.11 UNILATERAL PRIMARY OSTEOARTHRITIS, RIGHT KNEE: ICD-10-CM

## 2024-05-24 PROCEDURE — 99214 OFFICE O/P EST MOD 30 MIN: CPT

## 2024-09-10 ENCOUNTER — RX RENEWAL (OUTPATIENT)
Age: 89
End: 2024-09-10

## 2024-11-06 ENCOUNTER — APPOINTMENT (OUTPATIENT)
Dept: ORTHOPEDIC SURGERY | Facility: CLINIC | Age: 89
End: 2024-11-06
Payer: MEDICARE

## 2024-11-06 VITALS — HEIGHT: 61 IN | BODY MASS INDEX: 33.04 KG/M2 | WEIGHT: 175 LBS

## 2024-11-06 DIAGNOSIS — M19.011 PRIMARY OSTEOARTHRITIS, RIGHT SHOULDER: ICD-10-CM

## 2024-11-06 DIAGNOSIS — M19.012 PRIMARY OSTEOARTHRITIS, RIGHT SHOULDER: ICD-10-CM

## 2024-11-06 PROCEDURE — 20610 DRAIN/INJ JOINT/BURSA W/O US: CPT | Mod: LT

## 2024-11-06 PROCEDURE — 99214 OFFICE O/P EST MOD 30 MIN: CPT | Mod: 25

## 2024-11-20 ENCOUNTER — APPOINTMENT (OUTPATIENT)
Dept: ORTHOPEDIC SURGERY | Facility: CLINIC | Age: 89
End: 2024-11-20
Payer: MEDICARE

## 2024-11-20 VITALS — BODY MASS INDEX: 30.21 KG/M2 | HEIGHT: 61 IN | WEIGHT: 160 LBS

## 2024-11-20 DIAGNOSIS — M19.011 PRIMARY OSTEOARTHRITIS, RIGHT SHOULDER: ICD-10-CM

## 2024-11-20 DIAGNOSIS — M19.012 PRIMARY OSTEOARTHRITIS, RIGHT SHOULDER: ICD-10-CM

## 2024-11-20 PROCEDURE — 20610 DRAIN/INJ JOINT/BURSA W/O US: CPT | Mod: RT

## 2024-11-20 PROCEDURE — 99214 OFFICE O/P EST MOD 30 MIN: CPT | Mod: 25

## 2025-02-05 ENCOUNTER — APPOINTMENT (OUTPATIENT)
Dept: ORTHOPEDIC SURGERY | Facility: CLINIC | Age: 89
End: 2025-02-05
Payer: MEDICARE

## 2025-02-05 VITALS — WEIGHT: 150 LBS | HEIGHT: 61 IN | BODY MASS INDEX: 28.32 KG/M2

## 2025-02-05 DIAGNOSIS — M17.12 UNILATERAL PRIMARY OSTEOARTHRITIS, LEFT KNEE: ICD-10-CM

## 2025-02-05 PROCEDURE — 20610 DRAIN/INJ JOINT/BURSA W/O US: CPT | Mod: LT

## 2025-02-05 PROCEDURE — 99214 OFFICE O/P EST MOD 30 MIN: CPT | Mod: 25

## 2025-02-05 PROCEDURE — 73564 X-RAY EXAM KNEE 4 OR MORE: CPT | Mod: LT

## 2025-03-05 ENCOUNTER — APPOINTMENT (OUTPATIENT)
Dept: ORTHOPEDIC SURGERY | Facility: CLINIC | Age: 89
End: 2025-03-05
Payer: MEDICARE

## 2025-03-05 VITALS — WEIGHT: 150 LBS | HEIGHT: 61 IN | BODY MASS INDEX: 28.32 KG/M2

## 2025-03-05 DIAGNOSIS — M19.012 PRIMARY OSTEOARTHRITIS, LEFT SHOULDER: ICD-10-CM

## 2025-03-05 PROCEDURE — 20610 DRAIN/INJ JOINT/BURSA W/O US: CPT | Mod: LT

## 2025-03-05 PROCEDURE — 99214 OFFICE O/P EST MOD 30 MIN: CPT | Mod: 25

## 2025-03-05 RX ORDER — MELOXICAM 15 MG/1
15 TABLET ORAL
Qty: 30 | Refills: 0 | Status: ACTIVE | COMMUNITY
Start: 2025-03-05 | End: 1900-01-01

## 2025-03-28 ENCOUNTER — APPOINTMENT (OUTPATIENT)
Dept: ORTHOPEDIC SURGERY | Facility: CLINIC | Age: 89
End: 2025-03-28
Payer: MEDICARE

## 2025-03-28 VITALS — BODY MASS INDEX: 28.32 KG/M2 | HEIGHT: 61 IN | WEIGHT: 150 LBS

## 2025-03-28 DIAGNOSIS — M19.011 PRIMARY OSTEOARTHRITIS, RIGHT SHOULDER: ICD-10-CM

## 2025-03-28 DIAGNOSIS — M19.012 PRIMARY OSTEOARTHRITIS, RIGHT SHOULDER: ICD-10-CM

## 2025-03-28 PROCEDURE — 99214 OFFICE O/P EST MOD 30 MIN: CPT | Mod: 25

## 2025-03-28 PROCEDURE — 20610 DRAIN/INJ JOINT/BURSA W/O US: CPT | Mod: RT

## 2025-04-23 ENCOUNTER — APPOINTMENT (OUTPATIENT)
Dept: ORTHOPEDIC SURGERY | Facility: CLINIC | Age: 89
End: 2025-04-23
Payer: MEDICARE

## 2025-04-23 VITALS — BODY MASS INDEX: 28.32 KG/M2 | HEIGHT: 61 IN | WEIGHT: 150 LBS

## 2025-04-23 DIAGNOSIS — M70.61 TROCHANTERIC BURSITIS, RIGHT HIP: ICD-10-CM

## 2025-04-23 DIAGNOSIS — M25.551 PAIN IN RIGHT HIP: ICD-10-CM

## 2025-04-23 PROCEDURE — 99214 OFFICE O/P EST MOD 30 MIN: CPT | Mod: 25

## 2025-04-23 PROCEDURE — 20610 DRAIN/INJ JOINT/BURSA W/O US: CPT | Mod: RT

## 2025-04-23 PROCEDURE — 73502 X-RAY EXAM HIP UNI 2-3 VIEWS: CPT | Mod: RT

## 2025-06-18 ENCOUNTER — APPOINTMENT (OUTPATIENT)
Dept: ORTHOPEDIC SURGERY | Facility: CLINIC | Age: 89
End: 2025-06-18
Payer: MEDICARE

## 2025-06-18 PROBLEM — Z96.651 STATUS POST RIGHT KNEE REPLACEMENT: Status: ACTIVE | Noted: 2025-06-18

## 2025-06-18 PROCEDURE — 99214 OFFICE O/P EST MOD 30 MIN: CPT | Mod: 25

## 2025-06-18 PROCEDURE — 20610 DRAIN/INJ JOINT/BURSA W/O US: CPT | Mod: 50

## 2025-07-16 ENCOUNTER — APPOINTMENT (OUTPATIENT)
Dept: ORTHOPEDIC SURGERY | Facility: CLINIC | Age: 89
End: 2025-07-16
Payer: MEDICARE

## 2025-07-16 VITALS — BODY MASS INDEX: 28.32 KG/M2 | HEIGHT: 61 IN | WEIGHT: 150 LBS

## 2025-07-16 PROCEDURE — 20610 DRAIN/INJ JOINT/BURSA W/O US: CPT | Mod: RT

## 2025-07-16 PROCEDURE — 99214 OFFICE O/P EST MOD 30 MIN: CPT | Mod: 25

## 2025-09-03 ENCOUNTER — APPOINTMENT (OUTPATIENT)
Dept: ORTHOPEDIC SURGERY | Facility: CLINIC | Age: 89
End: 2025-09-03

## 2025-09-03 DIAGNOSIS — M17.12 UNILATERAL PRIMARY OSTEOARTHRITIS, LEFT KNEE: ICD-10-CM

## 2025-09-12 ENCOUNTER — APPOINTMENT (OUTPATIENT)
Dept: ORTHOPEDIC SURGERY | Facility: CLINIC | Age: 89
End: 2025-09-12
Payer: MEDICARE

## 2025-09-12 DIAGNOSIS — M17.12 UNILATERAL PRIMARY OSTEOARTHRITIS, LEFT KNEE: ICD-10-CM

## 2025-09-12 PROCEDURE — 99214 OFFICE O/P EST MOD 30 MIN: CPT | Mod: 25

## 2025-09-12 PROCEDURE — 20610 DRAIN/INJ JOINT/BURSA W/O US: CPT | Mod: LT
